# Patient Record
Sex: MALE | Race: WHITE
[De-identification: names, ages, dates, MRNs, and addresses within clinical notes are randomized per-mention and may not be internally consistent; named-entity substitution may affect disease eponyms.]

---
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
## 2021-01-08 ENCOUNTER — HOSPITAL ENCOUNTER (EMERGENCY)
Dept: HOSPITAL 97 - ER | Age: 21
Discharge: HOME | End: 2021-01-08
Payer: COMMERCIAL

## 2021-01-08 VITALS — SYSTOLIC BLOOD PRESSURE: 125 MMHG | OXYGEN SATURATION: 98 % | DIASTOLIC BLOOD PRESSURE: 84 MMHG

## 2021-01-08 VITALS — TEMPERATURE: 97.8 F

## 2021-01-08 DIAGNOSIS — Z88.8: ICD-10-CM

## 2021-01-08 DIAGNOSIS — N45.1: Primary | ICD-10-CM

## 2021-01-08 PROCEDURE — 76870 US EXAM SCROTUM: CPT

## 2021-01-08 PROCEDURE — 96372 THER/PROPH/DIAG INJ SC/IM: CPT

## 2021-01-08 PROCEDURE — 99284 EMERGENCY DEPT VISIT MOD MDM: CPT

## 2021-01-08 NOTE — RAD REPORT
EXAM DESCRIPTION:  US - Scrotum Testicles - 1/8/2021 6:20 pm

 

CLINICAL HISTORY:  PAIN

 

COMPARISON:  Scrotum Testicles dated 8/31/2019; Abdomen   Pelvis W Contrast dated 11/24/2020

 

FINDINGS:  Right testicle shows homogeneous echogenicity with no focal lesion. Doppler evaluation hugo
ws arterial and venous flow within the right testicle. No right epididymis enlargement or hyperemia.

 

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 In the left hemiscrotum there is a 3 centimeter oval heterogeneou
s mass. Doppler evaluation shows blood flow along the margin of this mass. This is probably scar tiss
ue or some form of benign so surgical mass. November 2020 CT study showed a similar. It is uncertain 
if the August 2019 study evaluated this portion of the left scrotum.

 

IMPRESSION:  No right testicle abnormality. Normal blood flow seen.

## 2021-01-08 NOTE — EDPHYS
Physician Documentation                                                                           

 [facility]                                                                 

Name: David Allison                                                                              

Age: 20 yrs                                                                                       

Sex: Male                                                                                         

: 2000                                                                                   

MRN: [id number]                                                                                   

Arrival Date: 2021                                                                          

Time: 17:05                                                                                       

Account#: [id number]                                                                            

Bed 17                                                                                            

Private MD:                                                                                       

ED Physician Arpit Pradhan                                                                         

HPI:                                                                                              

                                                                                             

19:52 This 20 yrs old  Male presents to ER via Ambulatory with complaints of         kb  

      Testicular Pain, Testicular Lump.                                                           

19:52 The patient presents with scrotal pain, of the left side, in the area of the            kb  

      epidydimis, with swelling. Onset: The symptoms/episode began/occurred this morning.         

      Modifying factors: The symptoms are alleviated by nothing, the symptoms are aggravated      

      by pressure. Associated signs and symptoms: The patient has no apparent associated          

      signs or symptoms. Severity of symptoms: At their worst the symptoms were mild,             

      moderate, in the emergency department the symptoms are unchanged. The patient has not       

      experienced similar symptoms in the past. The patient has not recently seen a physician.    

                                                                                                  

Historical:                                                                                       

- Allergies:                                                                                      

17:35 Abilify;                                                                                ss  

17:35 Advair Diskus;                                                                          ss  

17:35 Geodon;                                                                                 ss  

17:35 Seroquel;                                                                               ss  

17:35 Wellbutrin;                                                                             ss  

- PMHx:                                                                                           

17:35 ADD/ADHD; Anxiety; Depression; Hypertension; PTSD; testicular torsion;                  ss  

- PSHx:                                                                                           

17:35 left testicle removal;                                                                  ss  

                                                                                                  

- Immunization history:: Adult Immunizations up to date.                                          

- Social history:: Smoking status: Patient denies any tobacco usage or history of.                

                                                                                                  

                                                                                                  

ROS:                                                                                              

19:52 Constitutional: Negative for fever, chills, and weight loss, Cardiovascular: Negative   kb  

      for chest pain, palpitations, and edema, Respiratory: Negative for shortness of breath,     

      cough, wheezing, and pleuritic chest pain, Abdomen/GI: Negative for abdominal pain,         

      nausea, vomiting, diarrhea, and constipation, MS/Extremity: Negative for injury and         

      deformity, Skin: Negative for injury, rash, and discoloration, Neuro: Negative for          

      headache, weakness, numbness, tingling, and seizure.                                        

19:52 : Positive for testicular pain                                                            

                                                                                                  

Exam:                                                                                             

19:52 Constitutional:  This is a well developed, well nourished patient who is awake, alert,  kb  

      and in no acute distress. Head/Face:  Normocephalic, atraumatic. Chest/axilla:  Normal      

      chest wall appearance and motion.  Nontender with no deformity.  No lesions are             

      appreciated. Cardiovascular:  Regular rate and rhythm with a normal S1 and S2.  No          

      gallops, murmurs, or rubs.  Normal PMI, no JVD.  No pulse deficits. Respiratory:  Lungs     

      have equal breath sounds bilaterally, clear to auscultation and percussion.  No rales,      

      rhonchi or wheezes noted.  No increased work of breathing, no retractions or nasal          

      flaring. Abdomen/GI:  Soft, non-tender, with normal bowel sounds.  No distension or         

      tympany.  No guarding or rebound.  No evidence of tenderness throughout. Skin:  Warm,       

      dry with normal turgor.  Normal color with no rashes, no lesions, and no evidence of        

      cellulitis. MS/ Extremity:  Pulses equal, no cyanosis.  Neurovascular intact.  Full,        

      normal range of motion. Neuro:  Awake and alert, GCS 15, oriented to person, place,         

      time, and situation.  Cranial nerves II-XII grossly intact.  Motor strength 5/5 in all      

      extremities.  Sensory grossly intact.  Cerebellar exam normal.  Normal gait.                

19:52 : Male external genitalia: Circumcision noted. tenderness, of the epididymis area,        

      that is moderate.                                                                           

                                                                                                  

Vital Signs:                                                                                      

17:33  / 88; Pulse 90; Resp 15; Temp 97.8(TE); Pulse Ox 99% on R/A; Weight 95.25 kg;    ss  

      Height 5 ft. 9 in. (175.26 cm); Pain 8/10;                                                  

20:00  / 84; Pulse 88; Resp 18; Pulse Ox 98% on R/A;                                    wh  

17:33 Body Mass Index 31.01 (95.25 kg, 175.26 cm)                                             ss  

                                                                                                  

MDM:                                                                                              

19:38 Patient medically screened.                                                             kb  

19:51 Data reviewed: vital signs, nurses notes. Data interpreted: Pulse oximetry: on room air kb  

      is 99 %. Interpretation: normal. Counseling: I had a detailed discussion with the           

      patient and/or guardian regarding: the historical points, exam findings, and any            

      diagnostic results supporting the discharge/admit diagnosis, radiology results, the         

      need for outpatient follow up, a urologist, to return to the emergency department if        

      symptoms worsen or persist or if there are any questions or concerns that arise at home.    

                                                                                                  

                                                                                             

17:43 Order name: US Scrotum Testicles; Complete Time: 21:16                                    

                                                                                                  

Administered Medications:                                                                         

20:02 Drug: Doxycycline 100 mg Route: PO;                                                       

20:11 Follow up: Response: No adverse reaction                                                  

20:03 Drug: Rocephin (cefTRIAXone) 500 mg Route: IM; Site: right gluteus;                       

20:11 Follow up: Response: No adverse reaction                                                  

                                                                                                  

                                                                                                  

Disposition:                                                                                      

                                                                                             

09:17 Co-signature as Attending Physician, Arpit Pradhan MD.                                    rn  

                                                                                                  

Disposition:                                                                                      

21 19:46 Discharged to Home. Impression: Epididymitis.                                      

- Condition is Stable.                                                                            

- Discharge Instructions: Epididymitis.                                                           

- Prescriptions for Doxycycline Hyclate 100 mg Oral Tablet - take 1 tablet by ORAL                

  route every 12 hours; 20 tablet.                                                                

- Medication Reconciliation Form, Thank You Letter, Antibiotic Education, Prescription            

  Opioid Use form.                                                                                

- Follow up: Emergency Department; When: As needed; Reason: Worsening of condition.               

  Follow up: Private Physician; When: 2 - 3 days; Reason: Recheck today's complaints,             

  Continuance of care, Re-evaluation by your physician.                                           

                                                                                                  

                                                                                                  

                                                                                                  

Signatures:                                                                                       

Dispatcher [employer]                           EDElham Britton, FNP-C                 FNP-Ckb                                                   

Arpit Pradhan MD MD rn Smirch, Shelby, RN RN ss Habalo, Winsy                                                                                   

                                                                                                  

Corrections: (The following items were deleted from the chart)                                    

                                                                                             

19:52 19:51 Counseling: I had a detailed discussion with the patient and/or guardian            

      regarding: the historical points, exam findings, and any diagnostic results supporting      

      the discharge/admit diagnosis, the need for outpatient follow up, a urologist, to           

      return to the emergency department if symptoms worsen or persist or if there are any        

      questions or concerns that arise at home,                                                 

19:55 18:59 Urine Dipstick-Ancillary ordered. [facility]  

20:12 19:46 2021 19:46 Discharged to Home. Impression: Epididymitis. Condition is         

      Stable. Forms are Medication Reconciliation Form, Thank You Letter, Antibiotic              

      Education, Prescription Opioid Use. Follow up: Emergency Department; When: As needed;       

      Reason: Worsening of condition. Follow up: Private Physician; When: 2 - 3 days; Reason:     

      Recheck today's complaints, Continuance of care, Re-evaluation by your physician.         

                                                                                                  

**************************************************************************************************

## 2021-01-08 NOTE — XMS REPORT
Summary of Care

                           Created on:2020



Patient:David Allison

Sex:Male

:2000

External Reference #:[id number]





Demographics







                          Address                   [address] 



                                                    [place] [address]

 

                          Mobile Phone              [phone]

 

                          Phone                     [phone]

 

                          Home Phone                [phone]

 

                          Email Address             [email or web address]

 

                          Preferred Language        English

 

                          Marital Status            Single

 

                          Hinduism Affiliation     Unknown

 

                          Race                      White

 

                          Ethnic Group               or 









Author







                          Organization              [facility]

 

                          Address                   [address] [place] [address]









Support







                Name            Relationship    Address         Phone

 

                Nikita Allison Jr. Uncle           [address]  [phone]

[address]



                                                [place] [address] 









Care Team Providers







                    Name                Role                Phone

 

                    Pcp,  Does Not Have A Primary Care Provider [phone]









Encounter Details







             Date         Type         Department   Care Team    Description

 

                    2020          Letter (Out)        [facility] Message

s



                          Doctor Unassigned, No     



                                                            [address] [facility]



                                        Name



                                        



                                                            [place] [address]-

[address]



                                        [address] [facility]



                                        



                                       [phone] [place] [address] 







Allergies







             Active Allergy Reactions    Severity     Noted Date   Comments

 

             Aripiprazole Anxiety, Shortness of Medium       2015   Inform

ed by parent



                          Breath                                 

 

             Fluticasone  Shortness of Breath Medium       2015   Informed

 by parent



             Propion-Salmeterol                                        

 

             Ziprasidone Hcl Anxiety, Shortness of Medium       2015   Inf

ormed by parent



                          Breath                                 

 

             Quetiapine Fumarate Anaphylaxis, Medium       2015   Informed

 by parent



                          Shortness of Breath                           

 

             Bupropion    Anxiety, Shortness of Medium       2015   Inform

ed by parent



                          Breath                                 



documented as of this encounter (statuses as of 2020)



Medications







          Medication Sig       Dispensed Refills   Start Date End Date  Status

 

          hydrOXYzine (ATARAX) Take 25 mg by           0                        

     Active



          25 mg tablet mouth at bedtime.                                        

 

 

          propranolol (INDERAL) Take 10 mg by           0                       

      Active



          10 mg tablet mouth 2 (two)                                         



                    times daily.                                         

 

          NIFEdipine XL Take 30 mg by           0                             Ac

tive



          (NIFEDICAL XL) 30 mg mouth daily.                                     

    



          24 hr                                                       



          tabletIndications:                                                   



          Nausea and vomiting in                                                

   



          pediatric patient,                                                   



          Diarrhea, unspecified                                                 

  



          type, Gastroenteritis                                                 

  

 

          cetirizine 10 mg TK 1 T PO QHS           3         2016         

  Active



          tablet                                                      

 

          polyethylene glycol 17 as needed.           0         2017      

     Active



          gram/dose powder                                                   

 

          FLUoxetine 40 mg daily.              1         2017           Ac

tive



          capsule                                                     

 

          citalopram 20 mg Take 20 mg by           0                            

 Active



          tablet    mouth daily.                                         

 

          atomoxetine 18 mg atomoxetine 18 mg           0         2020    

       Active



          capsule   capsule                                           



documented as of this encounter (statuses as of 2020)



Active Problems







                          Problem                   Noted Date

 

                          Testicular torsion        2017



documented as of this encounter (statuses as of 2020)



Social History







             Tobacco Use  Types        Packs/Day    Years Used   Date

 

             Never Smoker                                        









                Smokeless Tobacco: Never Used                                 









                Alcohol Use     Drinks/Week     oz/Week         Comments

 

                Not Asked       0 Standard drinks or equivalent 0.0             









                          Sex Assigned at Birth     Date Recorded

 

                          Not on file               



documented as of this encounter



Last Filed Vital Signs

Not on filedocumented in this encounter



Plan of Treatment







                Health Maintenance Due Date        Last Done       Comments

 

                HEPATITIS B VACCINES (1 of 3 2001                      



                - 3-dose primary series)                                 

 

                HEPATITIS A VACCINES (1 of 2 2002                      



                - 2-dose series)                                 

 

                MMR VACCINES (1 of 2 - 2002                      



                Standard series)                                 

 

                VARICELLA VACCINES (1 of 2 - 2002                      



                2-dose childhood series)                                 

 

                DTaP,Tdap,and Td Vaccines (1 2008                      



                - Tdap)                                         

 

                MENINGOCOCCAL B VACCINES (1 2011                      



                of 2 - Risk Bexsero 2-dose                                 



                series)                                         

 

                HPV VACCINES (1 - Male 2012                      



                2-dose series)                                  

 

                WELL CARE VISIT: 12-21 YEARS 2013                      



                (yearly)                                        

 

                MENINGOCOCCAL VACCINE (1 - 2017                      



                2-dose series)                                  

 

                INFLUENZA VACCINE (#1) 2020      10/21/2019, 2018, 



                                                2014, Additional 



                                                history exists  

 

                Depression Screening 2021      

 

                IPV VACCINES    Aged Out                        No longer eligib

le



                                                                based on patient

's age



                                                                to complete this

 topic

 

                PNEUMOCOCCAL 0-64 YEARS Aged Out                        No longe

r eligible



                COMBINED SERIES                                 based on patient

's age



                                                                to complete this

 topic



documented as of this encounter



Results

Not on filedocumented in this encounter



Insurance







          Payer     Benefit Plan / Subscriber ID Effective Dates Phone     Addre

ss   Type



                    Group                                             

 

          [employer] [place] [employer] [id number] 2016-[employer] Medicaid



          HEALTH PLAN - HEALTH                                           



          MANAGED MEDICAID                                                   



documented as of this encounter

## 2021-01-08 NOTE — XMS REPORT
Summary of Care

                           Created on:2020



Patient:David Allison

Sex:Male

:2000

External Reference #:[id number]





Demographics







                          Address                   [address] 



                                                    [place] [address]

 

                          Mobile Phone              [phone]

 

                          Phone                     [phone]

 

                          Home Phone                [phone]

 

                          Email Address             [email or web address]

 

                          Preferred Language        English

 

                          Marital Status            Single

 

                          Congregational Affiliation     Unknown

 

                          Race                      White

 

                          Ethnic Group               or 









Author







                          Organization              [facility]

 

                          Address                   [address] [place] [id number]









Support







                Name            Relationship    Address         Phone

 

                Nikita Allison Jr. Uncle           [address]  [phone]

[address]



                                                [place] [address] 









Care Team Providers







                    Name                Role                Phone

 

                    Pcp,  Does Not Have A Primary Care Provider [phone]









Reason for Visit







                          Reason                    Comments

 

                          BITE                      x 3 wks, bilat lower legs, i

tchy

 

                          Sore Throat               x 2020







Encounter Details







             Date         Type         Department   Care Team    Description

 

             2020   Urgent Care  [facility] Unknown, Attending Sor

e throat 

(Primary Dx);



                                                            Medicine - [place]



                                        



Provider, [facility]               Bug bite with infection, initial encount

er



                                       [address] [place], [place]              



                                                            [address] [phone]              







Allergies







             Active Allergy Reactions    Severity     Noted Date   Comments

 

             Aripiprazole Anxiety, Shortness of Medium       2015   Inform

ed by parent



                          Breath                                 

 

             Fluticasone  Shortness of Breath Medium       2015   Informed

 by parent



             Propion-Salmeterol                                        

 

             Ziprasidone Hcl Anxiety, Shortness of Medium       2015   Inf

ormed by parent



                          Breath                                 

 

             Quetiapine Fumarate Anaphylaxis, Medium       2015   Informed

 by parent



                          Shortness of Breath                           

 

             Bupropion    Anxiety, Shortness of Medium       2015   Inform

ed by parent



                          Breath                                 



documented as of this encounter (statuses as of 2020)



Medications







          Medication Sig       Dispensed Refills   Start Date End Date  Status

 

          hydrOXYzine (ATARAX) Take 25 mg by           0                        

     Active



          25 mg tablet mouth at bedtime.                                        

 

 

          propranolol (INDERAL) Take 10 mg by           0                       

      Active



          10 mg tablet mouth 2 (two)                                         



                    times daily.                                         

 

          NIFEdipine XL Take 30 mg by           0                             Ac

tive



          (NIFEDICAL XL) 30 mg mouth daily.                                     

    



          24 hr                                                       



          tabletIndications:                                                   



          Nausea and vomiting                                                   



          in pediatric patient,                                                 

  



          Diarrhea, unspecified                                                 

  



          type, Gastroenteritis                                                 

  

 

          cetirizine 10 mg TK 1 T PO QHS           3         2016         

  Active



          tablet                                                      

 

          polyethylene glycol as needed.           0         2017         

  Active



          17 gram/dose powder                                                   

 

          FLUoxetine 40 mg daily.              1         2017           Ac

tive



          capsule                                                     

 

          citalopram 20 mg Take 20 mg by           0                            

 Active



          tablet    mouth daily.                                         

 

          atomoxetine 18 mg atomoxetine 18 mg           0         2020    

       Active



          capsule   capsule                                           

 

          cephALEXin (KEFLEX) Take 1 capsule by 14 capsule 0         2020 

12/15/2020 Active



          500 mg    mouth 2 (two)                                         



          capsuleIndications: times daily for 7                                 

        



          Bug bite with days.                                             



          infection, initial                                                   



          encounter                                                   

 

          diphenhydrAMINE Take 1 capsule by 20 capsule 0         2020 12/1

3/2020 Active



          (BENADRYL) 25 mg mouth every 6                                        

 



          capsuleIndications: (six) hours as                                    

     



          Bug bite with needed for                                         



          infection, initial Itching or                                         



          encounter Allergies for up                                         



                    to 5 days.                                         









          Hospital, Clinic, or Other Ordered Dose Route     Frequency Start Date

 End Date  

Status



          Facility Administered                                                 

  



          Medication                                                   

 

          dexamethasone (DECADRON 8 mg      IVP       ONCE      2020 Ended



          PHOSPHATE) injection 8 mg                                             

      



documented as of this encounter (statuses as of 2020)



Active Problems







                          Problem                   Noted Date

 

                          Testicular torsion        2017



documented as of this encounter (statuses as of 2020)



Social History







             Tobacco Use  Types        Packs/Day    Years Used   Date

 

             Never Smoker                                        









                Smokeless Tobacco: Never Used                                 









                Alcohol Use     Drinks/Week     oz/Week         Comments

 

                Not Asked       0 Standard drinks or equivalent 0.0             









                          Sex Assigned at Birth     Date Recorded

 

                          Not on file               



documented as of this encounter



Last Filed Vital Signs







                Vital Sign      Reading         Time Taken      Comments

 

                Blood Pressure  140/85          2020  5:11 PM CST 

 

                Pulse           90              2020  5:08 PM CST 

 

                Temperature     37.4 C (99.3 F) 2020  5:08 PM CST 

 

                Respiratory Rate 18              2020  5:08 PM CST 

 

                Oxygen Saturation 97%             2020  5:08 PM CST 

 

                Inhaled Oxygen Concentration -               -               

 

                Weight          89.4 kg (197 lb) 2020  5:08 PM CST 

 

                Height          172.7 cm (5' 8") 2020  5:08 PM CST 

 

                Body Mass Index 29.95           2020  5:08 PM CST 



documented in this encounter



Patient Instructions

Patient InstructionsParadise Winston FNP - 2020  5:00 PM CST

Patient Education



Infected Insect Bite or Sting



When an insect stings you, it injects venom. When an insect bites you, it does 
not. Stings and bitesmay cause a local reaction. Or they may cause a reaction 
that affects your whole body. Bites and stings may become infected. Signs of 
infection include redness,warmth, pain, drainage of pus,and swelling. 
Infections will need treatment with antibiotics and should get better over the 
next 10 days. But they can sometimes form a pocket of pus (abscess) that needs 
to be opened by a healthcare providerto release the pus.

Home care

The following will help you care for your bite or sting at home:

 If a stinger is still in your skin, it will need to be removed. Don't use 
tweezers that might push more venom into the skin. Gently scrape the stinger 
from the side with a firm object such as the side of a credit card. This will 
loosen it and remove it from your skin. Wash the area with soap and water.

 If itching is a problem, applying ice packs to the sting area will help.

 Wash the area with soap and water at least 3 times a day. Apply a topical 
antibiotic cream or ointment.

 You can use an over-the counter antihistamine unless your healthcare provider
has given you a prescription antihistamine. You may use antihistamines to reduce
itching if large areas of the skin are involved. Use lower doses during the 
daytime and higher doses at bedtime since the drug may make you sleepy. Don't 
use an antihistamine if you have glaucoma or if you are a man with trouble 
urinating due to an enlarged prostate. Some antihistamines cause less drowsiness
and are a good choice for daytime use.

 If oral antibiotics have been prescribed, be sure to take them as directed 
until they are all finished.

 You may use over-the-counter pain medicine to control pain, unless another 
pain medicine was prescribed. Talk with your healthcare provider before using 
acetaminophen or ibuprofen if you have chronic liver or kidney disease. Also 
talk with your doctor if you have ever had a stomach ulcer or digestive 
bleeding.

Follow-up care

Follow up with your healthcare provider, or as advised if you don't get better 
over the next 2 days or if your symptoms get worse.

Call 911

Call 911 if any of these occur:

 Swelling of the face, eyelids, mouth, throat, or tongue

 Trouble swallowing or breathing

 Chest tightness

When to seek medical advice

Call your healthcare provider right away if any of these occur:

 Spreading areas of redness or swelling

 Fever of 100.4F (38C) or higher, or as directed by your healthcare 
provider

 Increasing pain, redness, swelling or drainage

 Headache, fever, chills, muscle or joint aching, or vomiting,

 New rash

Pedro Luis last reviewed this educational content on 10/1/2019

 3874-5594 [employer]. [address] [place] [address] 
[address]. All rights reserved. This information is not intended as a substitute for
professional medical care. Always follow your healthcare professional's 
instructions.







Patient Education



Acute Viral Pharyngitis (Sore Throat)



You or your child have a sore throat (pharyngitis). This infection is caused by 
a virus. Itcan cause throat pain that is worse when swallowing, aching all 
over, headache,and fever. The infection may be spread by coughing, 
kissing,or touching others after touching your mouth or nose. Antibiotic me
dicines don't work against viruses. They are not used for treating this illness.

Home care

 If symptoms are severe, you or your child should rest at home. Return to work
or school when you,or your child, feel well enough.

 You or your child should drink plenty of fluids to prevent dehydration.

 Adults, and children 5 years and older, can use throat lozenges or numbing 
throat sprays to help reduce pain. Gargling with warm salt water will also help 
reduce throat pain. Dissolve 1/2 teaspoon of salt in 1 glass of warm water. 
Children can sip on juice or an ice pop. Children 5 years and older can also 
suck on a lollipop or hard candy. (Hard candy and lozenges can be a choking 
hazard in children younger than 5 years.)

 Dont eat salty or spicy foods or give them to your child. These can be 
irritating to the throat.

Medicines for a child: You can give your child acetaminophen for fever, 
fussiness, or discomfort. Inbabies over 6 months of age, you may use ibuprofen 
as well as acetaminophen. If your child has chronic liver or kidney disease or 
ever had a stomach ulcer or gastrointestinal bleeding, talk with your bibi 
healthcare provider before giving these medicines. Aspirin should never be used 
by any childunder 18 years of age who has a fever. It may cause severe liver 
damage and death. Don't give your child any other medicine without first asking 
your child's healthcare provider, especially the first time.

Medicines for an adult: You may use acetaminophen, naproxen, or ibuprofen to 
control pain or fever, unless another medicine was prescribed for this. If you 
have chronic liver or kidney disease or ever had a stomach ulcer or 
gastrointestinal bleeding, talk with your healthcare provider before using these
medicines.

Follow-up care

Follow up with a healthcare provider or as advised if you or your child are not 
getting better over the next week.

When to get medical advice

Call your healthcare provider right away if any of these occur:

 Fever (see Fever and children, below)

 New or worsening ear pain, sinus pain, or headache

 Painful lumps in the back of neck

 Stiff neck

 Lymph nodes are getting larger

 Cant open mouth wide due to throat pain

 New rash

 Other symptoms are getting worse

Call 911

Call 911 or get medical care right away if any of these occur:



 Trouble breathing or noisy breathing

 Muffled voice

 Can't swallow liquids, a lot of drooling, or any other symptoms that may mean
worsening swelling in the throat

 Signs of dehydration such as very dark urine or no urine, sunken eyes, 
dizziness



Fever and children

Use a digital thermometer to check your bibi temperature. Dont use a 
mercury thermometer. There are different kinds and uses of digital thermometers.
They include:

 Rectal. For children younger than 3 years, a rectal temperature is the most 
accurate.

 Forehead (temporal).  This works for children age 3 months and older. If a 
child under 3 months old has signs of illness, this can be used for a first 
pass. The provider may want to confirm with a rectal temperature.

 Ear (tympanic). Ear temperatures are accurate after 6 months of age, but not 
before.

 Armpit (axillary).  This is the least reliable but may be used for a first 
pass to check a child of any age with signs of illness. The provider may want to
confirm with a rectal temperature.

 Mouth (oral). Dont use a thermometer in your bibi mouth until he or 
she is at least 4 years old.

Use the rectal thermometer with care. Follow the product makers directions 
for correct use. Insert it gently. Label it and make sure its not used in the
mouth. It may pass on germs from the stool. If you dont feel OK using a 
rectal thermometer, ask the healthcare provider what type to use instead. When 
you talk with any healthcare provider about your bibi fever, tell him or 
her which type you used.

Below are guidelines to know if your young child has a fever. Your bibi 
healthcare provider maygive you different numbers for your child. Follow your 
providers specific instructions.

Fever readings for a baby under 3 months old:

 First, ask your bibi healthcare provider how you should take the 
temperature.

 Rectal or forehead: 100.4F (38C) or higher

 Armpit: 99F (37.2C) or higher

Fever readings for a child age 3 months to 36 months (3 years):

 Rectal, forehead, or ear: 102F (38.9C) or higher

 Armpit: 101F (38.3C) or higher

Call the healthcare provider in these cases:

 Repeated temperature of 104F (40C) or higher in a child of any age

 Fever of 100.4 or higher in baby younger than 3 months

 Fever that lasts more than 24 hours in a child under age 2

 Fever that lasts for 3 days in a child age 2 or older

[employer] last reviewed this educational content on 2020-2020 [employer]. All rights reserved. This information is
not intended as a substitute for professional medical care. Always follow your 
healthcare professional's instructions.





Electronically signed by Paradise Winston FNP at 2020  5:45 PM CST

documented in this encounter



Progress Notes

Juliet Pastrana RN - 2020  5:00 PM CST19 year old male has been identified
by  and name.  Verbal consent has been obtained by patient to have an 
injection, as ordered by the provider.



The site was cleaned with an alcohol swab and given intramuscularly (IM).  A 
band aid dressing was then applied to the injection site.  The patient tolerated
the procedure well and was observed for 20 minutes after the injection for 
possible reaction.



Patient provided with preferred teaching of verbal information on Anaphylaxis. 
Shows readiness to learn. Verbal/Written instruction teaching provided. 
Individual is able to read and verbalizes understanding of teaching provided. 
Signs and Symptoms of Anaphylaxis (severe allergic reaction) are: Tingling, 
itching or metallic taste in mouth; hives; difficulty breathing; swelling and/or
itching of mouth and/or throat; diarrhea, vomiting, cramps and stomach pain; 
paleness; loss of consciousness. IF YOU HAVE ANY OF THE SYMPTOMS ABOVE, ACT 
FAST!!! CALL 911 IMMEDIATELY IF YOU HAVE A PRESCRIBED EPI-PEN PLEASE USE IT NOW.
Electronically signed by Juliet Pastrana RN at 2020  6:14 PM Paradise Rosas FNP - 2020  5:00 PM CSTFormatting of this note might be different
from the original.

Urgent Care:

[facility]



Patient Name: David Allison

YOB: 2000 19 year old

MRN: [id number]

Primary Care Physician: PATIENT DOES NOT HAVE A PCP



Chief Complaint



Chief Complaint

Patient presents with

 BITE

  x 3 wks, bilat lower legs, itchy

 Sore Throat

  x 2020



HPI



19 yr old male here for 3 week history of insect bites. Patient states he is 
only outside hen he walks his do and noticed the bites after that. Patient also 
c/o sore throat and PND



Insect Bite

Location:  Leg

Leg rash location:  R lower leg and L lower leg

Quality: draining, itchiness, painful and redness

Onset quality:  Gradual

Duration:  3 weeks

Timing:  Constant

Progression:  Worsening

Chronicity:  New

Context: insect bite/sting

Relieved by:  Nothing

Worsened by:  Nothing

Associated symptoms: sore throat

Associated symptoms: no abdominal pain, no diarrhea, no fatigue, no fever, no 
headaches, no hoarse voice, no joint pain, no myalgias, no nausea, no 
periorbital edema, no shortness of breath, no throat swelling, no tongue 
swelling, no URI, not vomiting and not wheezing

Sore Throat

Location:  Generalized

Severity:  Mild

Onset quality:  Gradual

Duration:  3 days

Timing:  Constant

Chronicity:  New

Associated symptoms: postnasal drip

Associated symptoms: no abdominal pain, no adenopathy, no chest pain, no chills,
no cough, no drooling, no ear discharge, no ear pain, no eye discharge, no 
fever, no headaches, no neck stiffness, no night sweats, no plugged ear 
sensation, no rash, no rhinorrhea, no shortness of breath, no sinus congestion, 
no trouble swallowing and no voice change

Risk factors: no sick contacts



Past Medical History / Immunizations



Past Medical History:

Diagnosis Date

 Allergic rhinitis

 Anxiety

 Depression

 High blood pressure

 OCD (obsessive compulsive disorder)

 PTSD (post-traumatic stress disorder)









Past Surgical History



Past Surgical History:

Procedure Laterality Date

 ORCHIECTOMY Left 2017

 Surgeon: Akiko Ridley MD;  Location: [facility] OR Location

 ORCHIOPEXY Right 2017

 Surgeon: Akiko Ridley MD;  Location: [facility] OR Location

 SCROTAL EXPLORATION N/A 2017

 Surgeon: Akiko Ridley MD;  Location: [facility] OR Location



Allergies



Allergies

Allergen Reactions

 Abilify [Aripiprazole] Anxiety and Shortness of Breath

  Informed by parent

 Advair Diskus [Fluticasone Propion-Salmeterol] Shortness of Breath

  Informed by parent

 Geodon [Ziprasidone Hcl] Anxiety and Shortness of Breath

  Informed by parent

 Seroquel [Quetiapine Fumarate] Anaphylaxis and Shortness of Breath

  Informed by parent

 Wellbutrin [Bupropion] Anxiety and Shortness of Breath

  Informed by parent



Review of Systems



Review of Systems

Constitutional: Negative for chills, fatigue, fever and night sweats.

HENT: Positive for postnasal drip and sore throat. Negative for drooling, ear 
discharge, ear pain, hoarse voice, rhinorrhea, trouble swallowing and voice 
change.

Eyes: Negative for discharge.

Respiratory: Negative for cough, shortness of breath and wheezing.

Cardiovascular: Negative for chest pain.

Gastrointestinal: Negative for abdominal pain, diarrhea, nausea and vomiting.

Musculoskeletal: Negative for arthralgias, myalgias and neck stiffness.

Skin: Negative for rash.

Neurological: Negative for headaches.

Hematological: Negative for adenopathy.



Physical Exam



BP (!) 140/85  | Pulse 90  | Temp 37.4 C (99.3 F) (Oral)  | Resp 18  | Ht 5'
 8" (1.727 m)  | Wt 197 lb (89.4 kg)  | SpO2 97%  | BMI 29.95 kg/m



Physical Exam

Vitals signs and nursing note reviewed.

Constitutional:

   General: He is not in acute distress.

   Appearance: He is well-developed. He is not ill-appearing, toxic-appearing or
 diaphoretic.

HENT:

   Right Ear: Hearing, tympanic membrane, ear canal and external ear normal.

   Left Ear: Hearing, tympanic membrane, ear canal and external ear normal.

   Nose: Nose normal.

   Right Sinus: No maxillary sinus tenderness or frontal sinus tenderness.

   Left Sinus: No maxillary sinus tenderness or frontal sinus tenderness.

   Mouth/Throat:

   Pharynx: Uvula midline. No oropharyngeal exudate.

   Tonsils: 2+ on the right. 2+ on the left.

Eyes:

   Conjunctiva/sclera: Conjunctivae normal.

   Pupils: Pupils are equal, round, and reactive to light.

Neck:

   Musculoskeletal: Normal range of motion and neck supple.

Cardiovascular:

   Rate and Rhythm: Normal rate.

Pulmonary:

   Effort: Pulmonary effort is normal. No tachypnea, bradypnea, accessory muscle
 usage or respiratory distress.

   Breath sounds: Normal breath sounds. No decreased breath sounds, wheezing, 
rhonchi or rales.

Chest:

   Chest wall: No tenderness.

Musculoskeletal: Normal range of motion.

Skin:

   General: Skin is warm and dry.

   Capillary Refill: Capillary refill takes less than 2 seconds.

   Findings: No rash.

Neurological:

   Mental Status: He is alert and oriented to person, place, and time.

Psychiatric:

   Behavior: Behavior normal. Behavior is cooperative.

   Thought Content: Thought content normal.

   Judgment: Judgment normal.



Labs



Recent Results (from the past 24 hour(s))

POCT GRP A STREP (MOLECULAR)

 Collection Time: 20  5:33 PM

Result Value Ref Range

 POCT GP A STREP neg Negative - Negative



Orders and Treatments



Orders Placed This Encounter

Procedures

 POCT GRP A STREP (MOLECULAR)



Outpatient Encounter Medications as of 2020

Medication Sig

 cephALEXin (KEFLEX) 500 mg capsule Take 1 capsule by mouth 2 (two) times 
daily for 7 days.

 diphenhydrAMINE (BENADRYL) 25 mg capsule Take 1 capsule by mouth every 6 
(six) hours as needed for Itching or Allergies for up to 5 days.

 atomoxetine 18 mg capsule atomoxetine 18 mg capsule

 citalopram 20 mg tablet Take 20 mg by mouth daily.

 FLUoxetine 40 mg capsule daily.

 polyethylene glycol 17 gram/dose powder as needed.

 cetirizine 10 mg tablet TK 1 T PO QHS

 NIFEdipine XL (NIFEDICAL XL) 30 mg 24 hr tablet Take 30 mg by mouth daily.

 propranolol (INDERAL) 10 mg tablet Take 10 mg by mouth 2 (two) times daily.

 hydrOXYzine (ATARAX) 25 mg tablet Take 25 mg by mouth at bedtime.

 [] dexamethasone (DECADRON PHOSPHATE) injection 8 mg



No results found for this visit on 20.



Diagnosis



David was seen today for bite and sore throat.



Diagnoses and all orders for this visit:



Sore throat

-     POCT GRP A STREP (MOLECULAR)



Bug bite with infection, initial encounter

-     cephALEXin (KEFLEX) 500 mg capsule; Take 1 capsule by mouth 2 (two) times 
daily for 7 days.

-     diphenhydrAMINE (BENADRYL) 25 mg capsule; Take 1 capsule by mouth every 6 
(six) hours as needed for Itching or Allergies for up to 5 days.



Other orders

-     dexamethasone (DECADRON PHOSPHATE) injection 8 mg



Disposition &amp; Follow Up



- Discussed  diagnosis and treatment plan with pt.

- pt advised on frequent effective handwashing

- pt advised to increase fluid intake

-  advised to have the pt take OTC to treat symptoms.

- Pt advised to administer Tylenol as per label recommendation as needed for 
pain or fever

- AVS and Written/handout materials appropriate to problem and teaching 
provided.

- advised to go to the nearest Emergency Department sooner for any new, 
worsening, persistent, or concerning symptoms

- Patient verbalized understanding of all instructions



RIGOBERTO Choudhary  2020  5:22 PM

Electronically signed by Paradise Winston FNP at 2020  6:14 PM CST
Juliet Pastrana RN - 2020  5:00 PM CSTFormatting of this note might be 
different from the original.

David Allison is a 19 year old male

Chief Complaint

Patient presents with

 BITE

  x 3 wks, bilat lower legs, itchy

 Sore Throat

  x 2020



Vitals:

 20 1708 20 1711

BP: (!) 148/88 (!) 140/85

Pulse: 90

Resp: 18

Temp: 37.4 C (99.3 F)

TempSrc: Oral

SpO2: 97%

Weight: 197 lb (89.4 kg)

Height: 5' 8" (1.727 m)



[id number] - [place], [place] - [address] AT [facility] 
[address]

Phone: [phone] Fax: [phone]



 Patient AAOx4 and in no acute distress.



All Vitals taken, allergies and all medications reviewed, fall risk assessed.
Electronically signed by Juliet Pastrana RN at 2020  6:14 PM CSTdocumented
 in this encounter



Plan of Treatment







                Health Maintenance Due Date        Last Done       Comments

 

                HEPATITIS B VACCINES (1 of 3 2001                      



                - 3-dose primary series)                                 

 

                HEPATITIS A VACCINES (1 of 2 2002                      



                - 2-dose series)                                 

 

                MMR VACCINES (1 of 2 - 2002                      



                Standard series)                                 

 

                VARICELLA VACCINES (1 of 2 - 2002                      



                2-dose childhood series)                                 

 

                DTaP,Tdap,and Td Vaccines (1 2008                      



                - Tdap)                                         

 

                MENINGOCOCCAL B VACCINES (1 2011                      



                of 2 - Risk Bexsero 2-dose                                 



                series)                                         

 

                HPV VACCINES (1 - Male 2012                      



                2-dose series)                                  

 

                WELL CARE VISIT: 12-21 YEARS 2013                      



                (yearly)                                        

 

                MENINGOCOCCAL VACCINE (1 - 2017                      



                2-dose series)                                  

 

                INFLUENZA VACCINE (#1) 2020      10/21/2019, 2018, 



                                                2014, Additional 



                                                history exists  

 

                Depression Screening 2021      

 

                IPV VACCINES    Aged Out                        No longer eligib

le



                                                                based on patient

's age



                                                                to complete this

 topic

 

                PNEUMOCOCCAL 0-64 YEARS Aged Out                        No longe

r eligible



                COMBINED SERIES                                 based on patient

's age



                                                                to complete this

 topic



documented as of this encounter



Procedures







             Procedure Name Priority     Date/Time    Associated Diagnosis Comme

nts

 

             POCT GRP A STREP Routine      2020  5:33 PM Sore throat  Resu

lts for this



             (MOLECULAR)               CST                       procedure are i

n



                                                                 the results



                                                                 section.



documented in this encounter



Results

POCT GRP A STREP (MOLECULAR) (2020  5:33 PM CST)





             Component    Value        Ref Range    Performed At Pathologist Sig

nature

 

             POCT GP A STREP neg          Negative - Negative              









                                        Specimen

 

                                        Swab - THROAT









                          Narrative                 Performed At

 

                          accurate development and interpretation of all interna

l controls 



documented in this encounter



Visit Diagnoses







                                        Diagnosis

 

                                        Sore throat - Primary







                                        Acute pharyngitis

 

                                        Bug bite with infection, initial encount

er



documented in this encounter



Administered Medications







           Medication Order MAR Action Action Date Dose       Rate       Site

 

           dexamethasone (DECADRON Given      2020  5:34 PM 8 mg          

        Left



                                        PHOSPHATE) injection 8 mg



                          CST                                    Dorsogluteal-IM



           8 mg, IV Push, ONCE, 1                                             



           dose, Tue 20 at 1830,                                           

  



           Routine                                                









                                                    



documented in this encounter



Insurance







          Payer     Benefit Plan / Subscriber ID Effective Dates Phone     Addre

ss   Type



                    Group                                             

 

          [employer] [employer] [place] [employer] [id number] 2016-[employer] Medicaid



          HEALTH PLAN - HEALTH                                           



          MANAGED MEDICAID                                                   









           Guarantor Name Account Type Relation to Date of Birth Phone      Bill

ing



                                 Patient                          Address

 

           Caroline Allison Personal/Family Mother     1965 [phone] [address]

. 







                                                                 (Home)



                                        [place], [place]



                                                       [phone]



                                                       (Work)     



documented as of this encounter

## 2021-01-08 NOTE — XMS REPORT
Continuity of Care Document

                           Created on:2021



Patient:DAVID THAYER

Sex:Male

:2000

External Reference #:[id number]





Demographics







                          Address                   [address]



                                                    [place] 



                                                    [place] [address]

 

                          Home Phone                [phone]

 

                          Mobile Phone              [phone]

 

                          Email Address             DECLINED 2021

 

                          Preferred Language        English

 

                          Marital Status            Unknown

 

                          Anabaptism Affiliation     Unknown

 

                          Race                      Unknown

 

                          Additional Race(s)        Unavailable



                                                    White

 

                          Ethnic Group              Unknown









Author







                          Organization              [facility]

t

 

                          Address                   [address]



                                                    [place] [address]

 

                          Phone                     [phone]









Support







                Name            Relationship    Address         Phone

 

                Estefany Camarena      Uncle           [address]  [phone]



                                                [place] [address] 









Care Team Providers







                    Name                Role                Phone

 

                    Provider,  Urgent Care Attending Clinician Unavailable

 

                    Doctor Unassigned,  Name Attending Clinician Unavailable

 

                    Kalia ZHENG           Attending Clinician [phone]

 

                    Urology,  Pedi      Attending Clinician Unavailable









Problems

This patient has no known problems.



Allergies, Adverse Reactions, Alerts

This patient has no known allergies or adverse reactions.



Medications

This patient has no known medications.



Procedures

This patient has no known procedures.



Encounters







        Start   End     Encounter Admission Attending Care    Care    Encounter 

Source



        Date/Time Date/Time Type    Type    Clinicians Facility Department ID   

   

 

        2020 Urgent          Provider, [facility]    [id number] [id number]

[id number] 



        17:03:55 17:50:35 Care            [facility]  [id number]        

 



                                        Care    [place] 4.2.7.2.686         



                                                Maureen [phone]         



                                                nal     044             



                                                Office                  



                                                Building                 



                                                One                     

 

        2020 Letter          Doctor MORE    [id number]

27 



        00:00:00 00:00:00 (Out)           Unassigned, JAMEY   [id number]       

  



                                        [place] [facility]"" 4.2.7.2.686         



                                                        [phone]         



                                                        044             

 

        2020 Urgent          Provider, [facility]    [phone] [id number]

[id number] 



        15:39:22 15:59:22 Care            [facility]  [id number]        

 



                                        Care    [place] 4.2.7.2.686         



                                                Maureen [phone]         



                                                nal     044             



                                                Office                  



                                                Building                 



                                                One                     

 

        2020 Letter          Doctor  DERIK    [id number]

58 



        00:00:00 00:00:00 (Out)           Unassigned, JAMEY   [id number]       

  



                                        [place] [facility]\"" 4.2.7.2.686         



                                                        [phone]         



                                                        044             

 

        2020 Urgent          [facility]    [id number]

22 



        18:52:09 20:25:58 Care            [facility]  [id number]         



                                                Surgical 4.2.7.2.686         



                                                [facility] [phone]         



                                                      370             



                                                [place]                 

 

        2019-09-10 2019-09-10 Office          Urology, [facility]    [id number]

351 



        14:09:40 15:58:32 Visit           [facility] [id number]        

 



                                                [facility]     4.2.7.2.686         



                                                [place]  [phone]         



                                                        298             







Results

This patient has no known results.

## 2021-01-08 NOTE — ER
Nurse's Notes                                                                                     

 [facility]                                                                 

Name: David Allison                                                                              

Age: 20 yrs                                                                                       

Sex: Male                                                                                         

: 2000                                                                                   

MRN: [id number]                                                                                   

Arrival Date: 2021                                                                          

Time: 17:05                                                                                       

Account#: [id number]                                                                            

Bed 17                                                                                            

Private MD:                                                                                       

Diagnosis: Epididymitis                                                                           

                                                                                                  

Presentation:                                                                                     

                                                                                             

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  

      testicle and this morning when I woke up at 0900 I had pain on the L side and I noticed     

      there was a lump.". Coronavirus screen: Client denies travel out of the [place] in the         

      last 14 days. Ebola Screen: Patient denies exposure to infectious person. Patient           

      denies travel to an Ebola-affected area in the 21 days before illness onset. Initial        

      Sepsis Screen: Does the patient meet any 2 criteria? No. Patient's initial sepsis           

      screen is negative. Does the patient have a suspected source of infection? No.              

      Patient's initial sepsis screen is negative. Risk Assessment: Do you want to hurt           

      yourself or someone else? Patient reports no desire to harm self or others. Onset of        

      symptoms was 2021.                                                              

17:33 Method Of Arrival: Ambulatory                                                           ss  

17:33 Acuity: JOSEPH 3                                                                           ss  

                                                                                                  

Historical:                                                                                       

- Allergies:                                                                                      

17:35 Abilify;                                                                                ss  

17:35 Advair Diskus;                                                                          ss  

17:35 Geodon;                                                                                 ss  

17:35 Seroquel;                                                                               ss  

17:35 Wellbutrin;                                                                             ss  

- PMHx:                                                                                           

17:35 ADD/ADHD; Anxiety; Depression; Hypertension; PTSD; testicular torsion;                  ss  

- PSHx:                                                                                           

17:35 left testicle removal;                                                                  ss  

                                                                                                  

- Immunization history:: Adult Immunizations up to date.                                          

- Social history:: Smoking status: Patient denies any tobacco usage or history of.                

                                                                                                  

                                                                                                  

Screenin:40 Abuse screen: Denies threats or abuse. Denies injuries from another. Nutritional        wh  

      screening: No deficits noted. Tuberculosis screening: No symptoms or risk factors           

      identified. Fall Risk None identified.                                                      

                                                                                                  

Assessment:                                                                                       

19:40 General: Appears in no apparent distress. Behavior is calm, cooperative, appropriate    wh  

      for age. Pain: Complains of pain in testicular. Neuro: Level of Consciousness is awake,     

      alert, obeys commands, Oriented to person, place, time, situation, Appropriate for age.     

      Cardiovascular: Capillary refill < 3 seconds. Respiratory: Airway is patent Respiratory     

      effort is even, unlabored, Respiratory pattern is regular, symmetrical. GI: Abdomen is      

      flat, non-distended. : Reports Scrotal pain: sudden onset. EENT: No signs and/or          

      symptoms were reported regarding the EENT system. Derm: Skin is intact, is healthy with     

      good turgor, Skin is pink, warm \T\ dry. normal. Musculoskeletal: Circulation, motion,      

      and sensation intact.                                                                       

                                                                                                  

Vital Signs:                                                                                      

17:33  / 88; Pulse 90; Resp 15; Temp 97.8(TE); Pulse Ox 99% on R/A; Weight 95.25 kg;    ss  

      Height 5 ft. 9 in. (175.26 cm); Pain 8/10;                                                  

20:00  / 84; Pulse 88; Resp 18; Pulse Ox 98% on R/A;                                    wh  

17:33 Body Mass Index 31.01 (95.25 kg, 175.26 cm)                                               

                                                                                                  

ED Course:                                                                                        

17:05 Patient arrived in ED.                                                                  ag5 

17:34 Triage completed.                                                                       ss  

17:35 Arm band placed on right wrist.                                                         ss  

18:21 US Scrotum Testicles In Process Unspecified.                                            EDMS

18:59 Elham Guevara FNP-C is PHCP.                                                        kb  

18:59 Arpit Pradhan MD is Attending Physician.                                                kb  

19:40 Patient has correct armband on for positive identification. Bed in low position. Call     

      light in reach. Side rails up X 1. Pulse ox on. NIBP on.                                    

19:55 Elie Martinez is Primary Nurse.                                                           

20:04 No provider procedures requiring assistance completed. Patient did not have IV access     

      during this emergency room visit.                                                           

                                                                                                  

Administered Medications:                                                                         

20:02 Drug: Doxycycline 100 mg Route: PO;                                                     wh  

20:11 Follow up: Response: No adverse reaction                                                  

20:03 Drug: Rocephin (cefTRIAXone) 500 mg Route: IM; Site: right gluteus;                       

20:11 Follow up: Response: No adverse reaction                                                  

                                                                                                  

                                                                                                  

Outcome:                                                                                          

19:46 Discharge ordered by MD.                                                                kb  

20:04 Discharged to home ambulatory.                                                          wh  

20:04 Condition: stable                                                                           

20:04 Discharge instructions given to patient, Instructed on discharge instructions, follow       

      up and referral plans. medication usage, POC Demonstrated understanding of                  

      instructions, follow-up care, medications, POC Prescriptions given X 1.                     

20:12 Patient left the ED.                                                                      

                                                                                                  

Signatures:                                                                                       

Dispatcher [employer]                           EDMS                                                 

Elham Guevara, FNP-C                 FNP-Ckb                                                   

Julissa Romero, RN                      RN   Elie Calero Ajare                                ag5                                                  

                                                                                                  

**************************************************************************************************

## 2022-02-21 ENCOUNTER — HOSPITAL ENCOUNTER (EMERGENCY)
Dept: HOSPITAL 97 - ER | Age: 22
Discharge: LEFT BEFORE DISCHARGE | End: 2022-02-21
Payer: COMMERCIAL

## 2022-02-21 DIAGNOSIS — R07.9: Primary | ICD-10-CM

## 2022-02-21 DIAGNOSIS — F41.8: ICD-10-CM

## 2022-02-21 DIAGNOSIS — I10: ICD-10-CM

## 2022-02-21 DIAGNOSIS — Z88.8: ICD-10-CM

## 2022-02-21 DIAGNOSIS — Z20.822: ICD-10-CM

## 2022-02-21 LAB
ALBUMIN SERPL BCP-MCNC: 4.2 G/DL (ref 3.4–5)
ALP SERPL-CCNC: 97 U/L (ref 45–117)
ALT SERPL W P-5'-P-CCNC: 29 U/L (ref 12–78)
AST SERPL W P-5'-P-CCNC: 17 U/L (ref 15–37)
BUN BLD-MCNC: 13 MG/DL (ref 7–18)
GLUCOSE SERPLBLD-MCNC: 89 MG/DL (ref 74–106)
HCT VFR BLD CALC: 44 % (ref 39.6–49)
INR BLD: 1.03
LYMPHOCYTES # SPEC AUTO: 2.6 K/UL (ref 0.7–4.9)
MAGNESIUM SERPL-MCNC: 2.2 MG/DL (ref 1.8–2.4)
NT-PROBNP SERPL-MCNC: 13 PG/ML (ref ?–125)
PMV BLD: 9.4 FL (ref 7.6–11.3)
POTASSIUM SERPL-SCNC: 3.6 MMOL/L (ref 3.5–5.1)
RBC # BLD: 5.09 M/UL (ref 4.33–5.43)
TROPONIN I SERPL HS-MCNC: 4.3 PG/ML (ref ?–58.9)

## 2022-02-21 PROCEDURE — 85379 FIBRIN DEGRADATION QUANT: CPT

## 2022-02-21 PROCEDURE — 85610 PROTHROMBIN TIME: CPT

## 2022-02-21 PROCEDURE — 84484 ASSAY OF TROPONIN QUANT: CPT

## 2022-02-21 PROCEDURE — 83880 ASSAY OF NATRIURETIC PEPTIDE: CPT

## 2022-02-21 PROCEDURE — 71045 X-RAY EXAM CHEST 1 VIEW: CPT

## 2022-02-21 PROCEDURE — 83735 ASSAY OF MAGNESIUM: CPT

## 2022-02-21 PROCEDURE — 96374 THER/PROPH/DIAG INJ IV PUSH: CPT

## 2022-02-21 PROCEDURE — 85025 COMPLETE CBC W/AUTO DIFF WBC: CPT

## 2022-02-21 PROCEDURE — 36415 COLL VENOUS BLD VENIPUNCTURE: CPT

## 2022-02-21 PROCEDURE — 99284 EMERGENCY DEPT VISIT MOD MDM: CPT

## 2022-02-21 PROCEDURE — 93005 ELECTROCARDIOGRAM TRACING: CPT

## 2022-02-21 PROCEDURE — 80076 HEPATIC FUNCTION PANEL: CPT

## 2022-02-21 PROCEDURE — 80048 BASIC METABOLIC PNL TOTAL CA: CPT

## 2022-02-21 NOTE — RAD REPORT
EXAM DESCRIPTION:  [facility] Single View2/21/2022 6:14 pm

 

CLINICAL HISTORY:  Chest pain

 

COMPARISON:  2020

 

FINDINGS:   The lungs appear clear of acute infiltrate. The heart is normal size

 

IMPRESSION:   No acute abnormalities displayed

## 2022-02-21 NOTE — ER
Nurse's Notes                                                                                     

 [facility]                                                                 

Name: David Allison                                                                              

Age: 21 yrs                                                                                       

Sex: Male                                                                                         

: 2000                                                                                   

MRN: [id number]                                                                                   

Arrival Date: 2022                                                                          

Time: 15:21                                                                                       

Account#: [id number]                                                                            

Bed 8                                                                                             

Private MD:                                                                                       

Diagnosis: Chest pain, unspecified                                                                

                                                                                                  

Presentation:                                                                                     

                                                                                             

15:45 Chief complaint: Patient states: Midsternal chest pain, describes as squeezing, SOB,    ph  

      dizziness, worse w/ activity. Hx of cardiac problems, symptoms started yesterdsay.          

      Coronavirus screen: Vaccine status: Patient reports receiving the 1st dose of the Covid     

      vaccine. Ebola Screen: No symptoms or risks identified at this time. Initial Sepsis         

      Screen: Does the patient meet any 2 criteria? No. Patient's initial sepsis screen is        

      negative. Does the patient have a suspected source of infection? No. Patient's initial      

      sepsis screen is negative. Risk Assessment: Do you want to hurt yourself or someone         

      else? Patient reports no desire to harm self or others. Onset of symptoms was 2022.                                                                                   

15:45 Method Of Arrival: Ambulatory                                                           ph  

15:45 Acuity: JOSEPH 3                                                                           ph  

                                                                                                  

Triage Assessment:                                                                                

22:52 General: Appears in no apparent distress. comfortable, Behavior is cooperative,         st1 

      agitated. Respiratory: No deficits noted. Reports.                                          

23:04 Respiratory: Onset: The symptoms/episode began/occurred today.                          sm5 

23:04 Respiratory: the patient has mild shortness of breath.                                  sm5 

                                                                                                  

Historical:                                                                                       

- Allergies:                                                                                      

15:47 Abilify;                                                                                ph  

15:47 Advair Diskus;                                                                          ph  

15:47 Geodon;                                                                                 ph  

15:47 Seroquel;                                                                               ph  

15:47 Wellbutrin;                                                                             ph  

- Home Meds:                                                                                      

15:47 propranolol 10 mg Oral tab 1 tab daily [Active]; citalopram 20 mg tab 1.5 tabs once     ph  

      daily [Active]; hydroxyzine HCl 25 mg Oral tab 1 tab twice a day [Active]; nifedipine       

      90 mg oral tr24 1 tab [Active]; Strattera 18 mg Oral cap daily [Active];                    

- PMHx:                                                                                           

15:47 ADD/ADHD; Anxiety; Depression; Hypertension; ocd; PTSD; testicular torsion; Heart       ph  

      murmur;                                                                                     

                                                                                                  

- Immunization history:: Adult Immunizations up to date.                                          

- Social history:: Smoking status: Patient denies any tobacco usage or history of.                

                                                                                                  

                                                                                                  

Screenin:41 Abuse screen: Denies threats or abuse. Denies injuries from another. Nutritional        ww  

      screening: No deficits noted. Tuberculosis screening: No symptoms or risk factors           

      identified. Fall Risk None identified.                                                      

                                                                                                  

Assessment:                                                                                       

17:00 General: Appears in no apparent distress. comfortable. Pain: Complains of pain in       ww  

      chest. Neuro: Level of Consciousness is awake, alert, obeys commands, Oriented to           

      person, place, time, situation, Moves all extremities. Gait is steady. Cardiovascular:      

      Capillary refill is > 3 seconds Patient's skin is warm and dry. Rhythm is regular Chest     

      pain is located in chest wall. Respiratory: Airway is patent Respiratory effort is          

      even, unlabored, Respiratory pattern is regular, symmetrical. GI: : No signs and/or       

      symptoms were reported regarding the genitourinary system. Derm: No signs and/or            

      symptoms reported regarding the dermatologic system. Skin is intact, is healthy with        

      good turgor.                                                                                

18:41 Reassessment: Patient appears in no apparent distress at this time. No changes from       

      previously documented assessment. Patient and/or family updated on plan of care and         

      expected duration. Pain level reassessed. Patient is alert, oriented x 3, equal             

      unlabored respirations, skin warm/dry/pink.                                                 

22:49 Reassessment: the patient called on the call light and stated he wanted to leave the    [address] I advised that he would be leaving [place] and reviewed with the patient verbally     

      the risk of heart attack, cardiovascular disease, stroke and death. the patient stated      

      he understood the risks and signed the [place] paperwork.                                       

22:52 Respiratory: Breath sounds are clear bilaterally.                                       st1 

                                                                                                  

Vital Signs:                                                                                      

15:45  / 82; Pulse 73; Resp 18; Temp 98.0; Pulse Ox 100% on R/A; Weight 96.16 kg;       ph  

      Height 5 ft. 9 in. (175.26 cm);                                                             

19:00  / 95; Pulse 87; Resp 16; Pulse Ox 100% on R/A;                                   st1 

20:00  / 88; Pulse 73; Resp 16; Pulse Ox 99% on R/A;                                    st1 

21:00  / 82; Pulse 86; Resp 16; Pulse Ox 97% on R/A;                                    st1 

15:45 Body Mass Index 31.31 (96.16 kg, 175.26 cm)                                             ph  

                                                                                                  

ED Course:                                                                                        

15:21 Patient arrived in ED.                                                                  ds1 

15:47 Triage completed.                                                                       ph  

15:48 Arm band placed on.                                                                     ph  

16:51 Joey Galeano MD is Attending Physician.                                             lg 

17:00 Cardiac monitor on. Pulse ox on. NIBP on.                                               ww  

17:00 Inserted saline lock: 20 gauge in right antecubital area, using aseptic technique.      ww  

      Blood collected.                                                                            

17:06 Hugo Moreno, RN is Primary Nurse.                                                      jl7 

18:14 XRAY Chest (1 view) In Process Unspecified.                                             EDMS

18:39 transfer initiated to [facility] by Dr galeano.                                  bd  

18:41 Patient has correct armband on for positive identification. Bed in low position. Call   ww  

      light in reach. Side rails up X 1.                                                          

19:28 Primary Nurse role handed off by Hugo Moreno, VINOD                                       mw2 

22:46 Lorenza Miguel, RN is Primary Nurse.                                                   st1 

22:52 No provider procedures requiring assistance completed. IV discontinued, intact,         st1 

      bleeding controlled, No redness/swelling at site. Pressure dressing applied.                

                                                                                                  

Administered Medications:                                                                         

17:31 Drug: Aspirin Chewable Tablet 162 mg Route: PO;                                         ww  

17:31 Drug: Pepcid (famotidine) 20 mg Route: IVP; Site: left antecubital;                     ww  

                                                                                                  

                                                                                                  

Outcome:                                                                                          

17:43 ER care complete, transfer ordered by MD.                                               [facility] 

22:52 AMA AMA form signed                                                                     st1 

22:52 Condition: unchanged                                                                        

22:52 Instructed on AMA                                                                           

23:23 Patient left the ED.                                                                    bb  

                                                                                                  

Signatures:                                                                                       

Dispatcher [employer]                           EDMS                                                 

Franci Cordero                                                                                 

Joey Galeano MD MD cha Sanford, Demi                                ds1                                                  

Kaylan Johnson RN                     RN   bb                                                   

Emma Denis RN                      RN                                                      

Hugo Moreno, RN                        RN   jl7                                                  

Aleksandra Quezada                            mw2                                                  

Melissa Plummer RN RN sm5 Wood, Whitney, RN RN                                                      

Lorenza Miguel, VINOD BROCK   st1                                                  

                                                                                                  

**************************************************************************************************

## 2022-02-21 NOTE — XMS REPORT
Continuity of Care Document

                          Created on:2022



Patient:DAVID THAYER

Sex:Male

:2000

External Reference #:[id number]





Demographics







                          Address                   [address]



                                                    [address] 



                                                    [place] [address]

 

                          Home Phone                [phone]

 

                          Mobile Phone              [phone]

 

                          Email Address             DECLINED 2021

 

                          Preferred Language        English

 

                          Marital Status            Unknown

 

                          Scientology Affiliation     Unknown

 

                          Race                      Unknown

 

                          Additional Race(s)        White

 

                          Ethnic Group              Unknown









Author







                          Organization              [facility]

t

 

                          Address                   [address]



                                                    [place] [address]

 

                          Phone                     [phone]









Support







                Name            Relationship    Address         Phone

 

                Flaca Camarena      Uncle           [address]  [phone]



                                                [place] [address] 

 

                FLACA          M               [address]    Unavailable



                                                [place] [address] 









Care Team Providers







                    Name                Role                Phone

 

                    Kenia             Primary Care Physician [phone]

 

                    EBONY              Attending Clinician Unavailable

 

                    Ebony GERMAN         Attending Clinician [phone]

 

                    Pob,  Lab Main      Attending Clinician Unavailable

 

                    Doctor Unassigned,  Name Attending Clinician Unavailable

 

                    Vtc-Lab             Attending Clinician Unavailable

 

                    MARK              Attending Clinician Unavailable

 

                    Erick ZHENG         Attending Clinician [phone]

 

                    ERICK            Attending Clinician Unavailable

 

                    Oliver ZHENG          Attending Clinician [phone]

 

                    OLIVER             Attending Clinician Unavailable

 

                    Gramm FNP,  A       Attending Clinician [phone]

 

                    Provider,  Urgent Care Attending Clinician Unavailable

 

                    Unknown             Attending Clinician Unavailable

 

                    Anene FNP           Attending Clinician [phone]

 

                    ANENE               Attending Clinician Unavailable

 

                    Kalia ZHENG           Attending Clinician [phone]

 

                    UNKNOWN             Attending Clinician Unavailable

 

                    Urology,  Pedi      Attending Clinician Unavailable

 

                    Olga ZHENG    Attending Clinician [phone]









Payers







           Payer Name Policy Type Policy Number Effective Date Expiration Date KENIA CRAWFORDS            [id number]  2016            



           HEALTH                           00:00:00              







Problems







       Condition Condition Condition Status Onset  Resolution Last   Treating Co

mments 

Source



       Name   Details Category        Date   Date   Treatment Clinician        



                                                 Date                 

 

       Testicular Testicular Disease Active                              U

nivers



       torsion torsion               4-26                               ity of



                                   00:00:                             [place]



                                   00                                 Medical



                                                                      Branch







Allergies, Adverse Reactions, Alerts







       Allergy Allergy Status Severity Reaction(s) Onset  Inactive Treating Comm

ents 

Source



       Name   Type                        Date   Date   Clinician        

 

       Aripipra Propensi Active        Shortness of                Informe

d Univers



       zole   ty to                Breath 5-04                 by parent ity of



              adverse                      00:00:                      [place]



              reaction                      00                          Medical



              s to                                                    Branch



              drug                                                    

 

       Fluticas Propensi Active        Shortness of                Informe

d Univers



       one    ty to                Breath 5-04                 by parent ity of



       Propion- adverse                      00:00:                      [place]



       Salmeter reaction                      00                          Medica

l



       ol     s to                                                    Branch



              drug                                                    

 

       Ziprasid Propensi Active        Shortness of                Informe

d Univers



       one Hcl ty to                Breath 5-04                 by parent ity of



              adverse                      00:00:                      [place]



              reaction                      00                          Medical



              s to                                                    Branch



              drug                                                    

 

       Quetiapi Propensi Active        Shortness of                Informe

d Univers



       ne     ty to                Breath 5-04                 by parent ity of



       Fumarate adverse                      00:00:                      [place]



              reaction                      00                          Medical



              s to                                                    Branch



              drug                                                    

 

       Bupropio Propensi Active        Shortness of                Informe

d Univers



       n      ty to                Breath 5-04                 by parent ity of



              adverse                      00:00:                      [place]



              reaction                      00                          Medical



              s to                                                    Branch



              drug                                                    

 

       ARIPIPRA DRUG   Active Med    Anxiety 0                      Univers



       ZOLE   INGREDI                      5-04                        ity of



                                          00:00:                      [place]



                                          00                          Medical



                                                                      Branch

 

       FLUTICAS DRUG   Active Med    SOB    -0                      Univers



       ONE                                5-04                        ity of



       PROPION-                             00:00:                      [place]



       SALMETER                             00                          Medical



                                                                    Branch

 

       ZIPRASID DRUG   Active Med    Anxiety 2015-0                      Univers



       ONE HCL INGREDI                      5-04                        ity of



                                          00:00:                      [place]



                                          00                          [facility]

 

       QUETIAPI DRUG   Active Med    Anaphylaxis 2015-0                      Uni

vers



       NE     INGREDI                      5-04                        ity of



       FUMARATE                             00:00:                      [place]



                                          00                          [facility]

 

       BUPROPIO DRUG   Active Med    Anxiety -0                      Univers



       N      INGREDI                      5-04                        ity of



                                          00:00:                      [place]



                                          00                          [facility]







Social History







           Social Habit Start Date Stop Date  Quantity   Comments   Source

 

           Exposure to                       Not sure              [facility]

 [place]



           SARS-CoV-2 (event)                                             Medica

l [place]

 

           Tobacco use and 2021 Never used            [facility]



           exposure   00:00:00   00:00:00                         [facility]

 

           Alcohol intake 2021 0 /d                  [facility]

 [facility] [place]



                      00:00:00   00:00:00                         [facility]

 

           Sex Assigned At 2000                       [facility]



           Birth      00:00:00   00:00:00                         Medical [facility]









                Smoking Status  Start Date      Stop Date       Source

 

                Never smoker                                    [facility]







Medications







       Ordered Filled Start  Stop   Current Ordering Indication Dosage Frequency

 Signature

                    Comments            Components          Source



     Medication Medication Date Date Medication? Clinician                (SIG) 

          



     Name Name                                                   

 

     dexamethaso      2020- No             8mg                      Unive

rs



     ne                                                 ity of



     (DECADRON      00:30: 23:34                                         [place]



     PHOSPHATE)      00   :00                                          Medical



     injection 8                                                        Branch



     mg                                                          

 

     dexamethaso      2020- No             8mg       8 mg, IV           U

nivers



     ne                                  Push,           ity of



     (DECADRON      00:30: 23:34                          ONCE, 1           [place]

s



     PHOSPHATE)      00   :00                           dose, Tue           Medi

maria a



     injection 8                                         20 at           Br

anch



     mg                                           1830,           



                                                  Routine           

 

     cephALEXin      2020- No        [id number] 500mg      Take 1         

  [facility]



     (KEFLEX)      16                          capsule by           ity 

of



     500 mg      00:00: 05:59                          mouth 2           [place]



     capsule      00   :00                           (two)           Medical



                                                  times           Branch



                                                  daily for           



                                                  7 days.           

 

     diphenhydrA      2020- No        [id number] 25mg      Take 1         

  [facility]



     MINE      14                          capsule by           ity of



     (BENADRYL)      00:00: 05:59                          mouth           [place]



     25 mg      00   :00                           every 6           Medical



     capsule                                         (six)           Branch



                                                  hours as           



                                                  needed for           



                                                  Itching or           



                                                  Allergies           



                                                  for up to           



                                                  5 days.           

 

     hydrOXYzine      2020-0      Yes            25mg      Take 25 mg           

Univers



     (ATARAX) 25      8-05                               by mouth           ity 

of



     mg tablet      20:44:                               at             [place]



               36                                 bedtime.           Medical



                                                                 Branch

 

     propranolol      2020-0      Yes            10mg      Take 10 mg           

Univers



     (INDERAL)      8-05                               by mouth 2           ity 

of



     10 mg      20:44:                               (two)           [place]



     tablet      36                                 times           Medical



                                                  daily.           Branch

 

     NIFEdipine      2020-0      Yes       [id number] 30mg      Take 30 mg        

   [facility]



     XL        8-05                               by mouth           ity of



     (NIFEDICAL      20:44:                               daily.           [place]



     XL) 30 mg      36                                                Medical



     24 hr                                                        Branch



     tablet                                                        

 

     citalopram      2020-0      Yes            20mg      Take 20 mg           U

nivers



     20 mg      8-05                               by mouth           ity of



     tablet      20:44:                               daily.           [place]



               36                                                Medical



                                                                 Branch

 

     hydrOXYzine      2020-0      Yes            25mg      Take 25 mg           

Univers



     (ATARAX) 25      8-05                               by mouth           ity 

of



     mg tablet      20:44:                               at             [place]



               36                                 bedtime.           Medical



                                                                 Branch

 

     propranolol      2020-0      Yes            10mg      Take 10 mg           

Univers



     (INDERAL)      8-05                               by mouth 2           ity 

of



     10 mg      20:44:                               (two)           [place]



     tablet      36                                 times           Medical



                                                  daily.           Branch

 

     NIFEdipine      2020-0      Yes       [id number] 30mg      Take 30 mg        

   Univers



     XL        8-05                               by mouth           ity of



     (NIFEDICAL      20:44:                               daily.           [place]



     XL) 30 mg      36                                                Medical



     24 hr                                                        Branch



     tablet                                                        

 

     citalopram      2020-0      Yes            20mg      Take 20 mg           U

nivers



     20 mg      8-05                               by mouth           ity of



     tablet      20:44:                               daily.           [place]



               36                                                Medical



                                                                 Branch

 

     hydrOXYzine      2020-0      Yes            25mg      Take 25 mg           

Univers



     (ATARAX) 25      8-05                               by mouth           ity 

of



     mg tablet      20:44:                               at             [place]



               36                                 bedtime.           Medical



                                                                 Branch

 

     propranolol      2020-0      Yes            10mg      Take 10 mg           

Univers



     (INDERAL)      8-05                               by mouth 2           ity 

of



     10 mg      20:44:                               (two)           [place]



     tablet      36                                 times           Medical



                                                  daily.           Branch

 

     NIFEdipine      2020-0      Yes       [id number] 30mg      Take 30 mg        

   [facility]



     XL        8-05                               by mouth           ity of



     (NIFEDICAL      20:44:                               daily.           [place]



     XL) 30 mg      36                                                Medical



     24 hr                                                        Branch



     tablet                                                        

 

     citalopram      2020-0      Yes            20mg      Take 20 mg           U

nivers



     20 mg      8-05                               by mouth           ity of



     tablet      20:44:                               daily.           [place]



               36                                                Medical



                                                                 Branch

 

     hydrOXYzine      2020-0      Yes            25mg      Take 25 mg           

Univers



     (ATARAX) 25      8-05                               by mouth           ity 

of



     mg tablet      20:44:                               at             [place]



               36                                 bedtime.           Medical



                                                                 Branch

 

     propranolol      2020-0      Yes            10mg      Take 10 mg           

Univers



     (INDERAL)      8-05                               by mouth 2           ity 

of



     10 mg      20:44:                               (two)           [place]



     tablet      36                                 times           Medical



                                                  daily.           Branch

 

     NIFEdipine      2020-0      Yes       [id number] 30mg      Take 30 mg        

   [facility]



     XL        8-05                               by mouth           ity of



     (NIFEDICAL      20:44:                               daily.           [place]



     XL) 30 mg      36                                                Medical



     24 hr                                                        Branch



     tablet                                                        

 

     citalopram      2020-0      Yes            20mg      Take 20 mg           U

nivers



     20 mg      8-05                               by mouth           ity of



     tablet      20:44:                               daily.           [place]



               36                                                Medical



                                                                 Branch

 

     hydrOXYzine      2020-0      Yes            25mg      Take 25 mg           

Univers



     (ATARAX) 25      8-05                               by mouth           ity 

of



     mg tablet      20:44:                               at             [place]



               36                                 bedtime.           Medical



                                                                 Branch

 

     propranolol      2020-0      Yes            10mg      Take 10 mg           

Univers



     (INDERAL)      8-05                               by mouth 2           ity 

of



     10 mg      20:44:                               (two)           [place]



     tablet      36                                 times           Medical



                                                  daily.           Branch

 

     NIFEdipine      2020-0      Yes       [id number] 30mg      Take 30 mg        

   [facility]



     XL        8-05                               by mouth           ity of



     (NIFEDICAL      20:44:                               daily.           [place]



     XL) 30 mg      36                                                Medical



     24 hr                                                        Branch



     tablet                                                        

 

     citalopram      2020-0      Yes            20mg      Take 20 mg           U

nivers



     20 mg      8-05                               by mouth           ity of



     tablet      20:44:                               daily.           [place]



               36                                                Medical



                                                                 Branch

 

     hydrOXYzine      2020-0      Yes            25mg      Take 25 mg           

Univers



     (ATARAX) 25      8-05                               by mouth           ity 

of



     mg tablet      20:44:                               at             [place]



               36                                 bedtime.           Medical



                                                                 Branch

 

     propranolol      2020-0      Yes            10mg      Take 10 mg           

Univers



     (INDERAL)      8-05                               by mouth 2           ity 

of



     10 mg      20:44:                               (two)           [place]



     tablet      36                                 times           Medical



                                                  daily.           Branch

 

     NIFEdipine      2020-0      Yes       [id number] 30mg      Take 30 mg        

   [facility]



     XL        8-05                               by mouth           ity of



     (NIFEDICAL      20:44:                               daily.           [place]



     XL) 30 mg      36                                                Medical



     24 hr                                                        Branch



     tablet                                                        

 

     citalopram      2020-0      Yes            20mg      Take 20 mg           U

nivers



     20 mg      8-05                               by mouth           ity of



     tablet      20:44:                               daily.           [place]



               36                                                Medical



                                                                 Branch

 

     hydrOXYzine      2020-0      Yes            25mg      Take 25 mg           

Univers



     (ATARAX) 25      8-05                               by mouth           ity 

of



     mg tablet      20:44:                               at             [place]



               36                                 bedtime.           Medical



                                                                 Branch

 

     propranolol      2020-0      Yes            10mg      Take 10 mg           

Univers



     (INDERAL)      8-05                               by mouth 2           ity 

of



     10 mg      20:44:                               (two)           [place]



     tablet      36                                 times           Medical



                                                  daily.           Branch

 

     NIFEdipine      2020-0      Yes       [id number] 30mg      Take 30 mg        

   [facility]



     XL        8-05                               by mouth           ity of



     (NIFEDICAL      20:44:                               daily.           [place]



     XL) 30 mg      36                                                Medical



     24 hr                                                        Branch



     tablet                                                        

 

     citalopram      2020-0      Yes            20mg      Take 20 mg           U

nivers



     20 mg      8-05                               by mouth           ity of



     tablet      20:44:                               daily.           [place]



               36                                                Medical



                                                                 Branch

 

     hydrOXYzine      2020-0      Yes            25mg      Take 25 mg           

[facility]



     (ATARAX) 25      8-05                               by mouth           [facility] 

of



     mg tablet      20:44:                               at             [place]



               36                                 bedtime.           Medical



                                                                 Branch

 

     propranolol      2020-0      Yes            10mg      Take 10 mg           

[facility]



     (INDERAL)      8-05                               by mouth 2           [facility] 

of



     10 mg      20:44:                               (two)           [place]



     tablet      36                                 times           Medical



                                                  daily.           Branch

 

     NIFEdipine      2020-0      Yes       [id number] 30mg      Take 30 mg        

   [facility]



     XL        8-05                               by mouth           ity of



     (NIFEDICAL      20:44:                               daily.           [place]



     XL) 30 mg      36                                                Medical



     24 hr                                                        Branch



     tablet                                                        

 

     citalopram      2020-0      Yes            20mg      Take 20 mg           [facility]

[facility]



     20 mg      8-05                               by mouth           ity of



     tablet      20:44:                               daily.           [place]



               36                                                Medical



                                                                 Branch

 

     hydrOXYzine      2020-0      Yes            25mg      Take 25 mg           

[facility]



     (ATARAX) 25      8-05                               by mouth           [facility] 

of



     mg tablet      20:44:                               at             [place]



               36                                 bedtime.           Medical



                                                                 Branch

 

     propranolol      2020-0      Yes            10mg      Take 10 mg           

[facility]



     (INDERAL)      8-05                               by mouth 2           [facility] 

of



     10 mg      20:44:                               (two)           [place]



     tablet      36                                 times           Medical



                                                  daily.           Branch

 

     NIFEdipine      2020-0      Yes       [id number] 30mg      Take 30 mg        

   [facility]



     XL        8-05                               by mouth           ity of



     (NIFEDICAL      20:44:                               daily.           [place]



     XL) 30 mg      36                                                Medical



     24 hr                                                        Branch



     tablet                                                        

 

     citalopram      2020-0      Yes            20mg      Take 20 mg           [facility]

[facility]



     20 mg      8-05                               by mouth           ity of



     tablet      20:44:                               daily.           [place]



               36                                                Medical



                                                                 Branch

 

     hydrOXYzine      2020-0      Yes            25mg      Take 25 mg           

[facility]



     (ATARAX) 25      8-05                               by mouth           [facility] 

of



     mg tablet      20:44:                               at             [place]



               36                                 bedtime.           Medical



                                                                 Branch

 

     propranolol      2020-0      Yes            10mg      Take 10 mg           

[facility]



     (INDERAL)      8-05                               by mouth 2           ity 

of



     10 mg      20:44:                               (two)           [place]



     tablet      36                                 times           Medical



                                                  daily.           Branch

 

     NIFEdipine      2020-0      Yes       [id number] 30mg      Take 30 mg        

   [facility]



     XL        8-05                               by mouth           ity of



     (NIFEDICAL      20:44:                               daily.           [place]



     XL) 30 mg      36                                                Medical



     24 hr                                                        Branch



     tablet                                                        

 

     citalopram      2020-0      Yes            20mg      Take 20 mg           [facility]

[facility]



     20 mg      8-05                               by mouth           ity of



     tablet      20:44:                               daily.           [place]



               36                                                Medical



                                                                 Branch

 

     hydrOXYzine      2020-0      Yes            25mg      Take 25 mg           

[facility]



     (ATARAX) 25      8-05                               by mouth           ity 

of



     mg tablet      20:44:                               at             [place]



               36                                 bedtime.           Medical



                                                                 Branch

 

     propranolol      2020-0      Yes            10mg      Take 10 mg           

[facility]



     (INDERAL)      8-05                               by mouth 2           ity 

of



     10 mg      20:44:                               (two)           [place]



     tablet      36                                 times           Medical



                                                  daily.           Branch

 

     NIFEdipine      2020-0      Yes       [id number] 30mg      Take 30 mg        

   [facility]



     XL        8-05                               by mouth           ity of



     (NIFEDICAL      20:44:                               daily.           [place]



     XL) 30 mg      36                                                Medical



     24 hr                                                        Branch



     tablet                                                        

 

     citalopram      2020-0      Yes            20mg      Take 20 mg           [facility]

[facility]



     20 mg      8-05                               by mouth           ity of



     tablet      20:44:                               daily.           [place]



               36                                                Medical



                                                                 Branch

 

     hydrOXYzine      2020-0      Yes            25mg      Take 25 mg           

[facility]



     (ATARAX) 25      8-05                               by mouth           ity 

of



     mg tablet      20:44:                               at             [place]



               36                                 bedtime.           Medical



                                                                 Branch

 

     propranolol      2020-0      Yes            10mg      Take 10 mg           

[facility]



     (INDERAL)      8-05                               by mouth 2           ity 

of



     10 mg      20:44:                               (two)           [place]



     tablet      36                                 times           Medical



                                                  daily.           Branch

 

     NIFEdipine      2020-0      Yes       [id number] 30mg      Take 30 mg        

   [facility]



     XL        8-05                               by mouth           ity of



     (NIFEDICAL      20:44:                               daily.           [place]



     XL) 30 mg      36                                                Medical



     24 hr                                                        Branch



     tablet                                                        

 

     citalopram      2020-0      Yes            20mg      Take 20 mg           [facility]

nivers



     20 mg      8-05                               by mouth           ity of



     tablet      20:44:                               daily.           [place]



               36                                                Medical



                                                                 Branch

 

     hydrOXYzine      2020-0      Yes            25mg      Take 25 mg           

[facility]



     (ATARAX) 25      8-05                               by mouth           [facility] 

of



     mg tablet      20:44:                               at             [place]



               36                                 bedtime.           Medical



                                                                 Branch

 

     propranolol      2020-0      Yes            10mg      Take 10 mg           

[facility]



     (INDERAL)      8-05                               by mouth 2           ity 

of



     10 mg      20:44:                               (two)           [place]



     tablet      36                                 times           Medical



                                                  daily.           Branch

 

     NIFEdipine      2020-0      Yes       [id number] 30mg      Take 30 mg        

   [facility]



     XL        8-05                               by mouth           ity of



     (NIFEDICAL      20:44:                               daily.           [place]



     XL) 30 mg      36                                                Medical



     24 hr                                                        Branch



     tablet                                                        

 

     citalopram      2020-0      Yes            20mg      Take 20 mg           [facility]

nivers



     20 mg      8-05                               by mouth           ity of



     tablet      20:44:                               daily.           [place]



               36                                                Medical



                                                                 Branch

 

     hydrOXYzine      2020-0      Yes            25mg      Take 25 mg           

[facility]



     (ATARAX) 25      8-05                               by mouth           [facility] 

of



     mg tablet      20:44:                               at             [place]



               36                                 bedtime.           Medical



                                                                 Branch

 

     propranolol      2020-0      Yes            10mg      Take 10 mg           

[facility]



     (INDERAL)      8-05                               by mouth 2           ity 

of



     10 mg      20:44:                               (two)           [place]



     tablet      36                                 times           Medical



                                                  daily.           Branch

 

     NIFEdipine      2020-0      Yes       [id number] 30mg      Take 30 mg        

   [facility]



     XL        8-05                               by mouth           ity of



     (NIFEDICAL      20:44:                               daily.           [place]



     XL) 30 mg      36                                                Medical



     24 hr                                                        Branch



     tablet                                                        

 

     citalopram      2020-0      Yes            20mg      Take 20 mg           [facility]

nivers



     20 mg      8-05                               by mouth           ity of



     tablet      20:44:                               daily.           [place]



               36                                                Medical



                                                                 Branch

 

     hydrOXYzine      2020-0      Yes            25mg      Take 25 mg           

[facility]



     (ATARAX) 25      8-05                               by mouth           ity 

of



     mg tablet      20:44:                               at             [place]



               36                                 bedtime.           Medical



                                                                 Branch

 

     propranolol      2020-0      Yes            10mg      Take 10 mg           

[facility]



     (INDERAL)      8-05                               by mouth 2           ity 

of



     10 mg      20:44:                               (two)           [place]



     tablet      36                                 times           Medical



                                                  daily.           Branch

 

     NIFEdipine      2020-0      Yes       [id number] 30mg      Take 30 mg        

   [facility]



     XL        8-05                               by mouth           ity of



     (NIFEDICAL      20:44:                               daily.           [place]



     XL) 30 mg      36                                                Medical



     24 hr                                                        Branch



     tablet                                                        

 

     citalopram      2020-0      Yes            20mg      Take 20 mg           [facility]

nivers



     20 mg      8-05                               by mouth           ity of



     tablet      20:44:                               daily.           [place]



               36                                                Medical



                                                                 Branch

 

     hydrOXYzine      2020-0      Yes            25mg      Take 25 mg           

[facility]



     (ATARAX) 25      8-05                               by mouth           ity 

of



     mg tablet      20:44:                               at             [place]



               36                                 bedtime.           Medical



                                                                 Branch

 

     propranolol      2020-0      Yes            10mg      Take 10 mg           

[facility]



     (INDERAL)      8-05                               by mouth 2           ity 

of



     10 mg      20:44:                               (two)           [place]



     tablet      36                                 times           Medical



                                                  daily.           Branch

 

     NIFEdipine      2020-0      Yes       [id number] 30mg      Take 30 mg        

   [facility]



     XL        8-05                               by mouth           ity of



     (NIFEDICAL      20:44:                               daily.           [place]



     XL) 30 mg      36                                                Medical



     24 hr                                                        [facility]



     tablet                                                        

 

     citalopram      2020-0      Yes            20mg      Take 20 mg           [facility]

nivers



     20 mg      8-05                               by mouth           ity of



     tablet      20:44:                               daily.           [place]



               36                                                Medical



                                                                 Branch

 

     hydrOXYzine      2020-0      Yes            25mg      Take 25 mg           

[facility]



     (ATARAX) 25      8-05                               by mouth           ity 

of



     mg tablet      20:44:                               at             [place]



               36                                 bedtime.           Medical



                                                                 Branch

 

     propranolol      2020-0      Yes            10mg      Take 10 mg           

[facility]



     (INDERAL)      8-05                               by mouth 2           ity 

of



     10 mg      20:44:                               (two)           [place]



     tablet      36                                 times           Medical



                                                  daily.           Branch

 

     NIFEdipine      2020-0      Yes       [id number] 30mg      Take 30 mg        

   [facility]



     XL        8-05                               by mouth           ity of



     (NIFEDICAL      20:44:                               daily.           [place]



     XL) 30 mg      36                                                Medical



     24 hr                                                        [facility]



     tablet                                                        

 

     citalopram      2020-0      Yes            20mg      Take 20 mg           [facility]

nivers



     20 mg      8-05                               by mouth           ity of



     tablet      20:44:                               daily.           [place]



               36                                                Medical



                                                                 Branch

 

     hydrOXYzine      2020-0      Yes            25mg      Take 25 mg           

[facility]



     (ATARAX) 25      8-05                               by mouth           ity 

of



     mg tablet      20:44:                               at             [place]



               36                                 bedtime.           Medical



                                                                 Branch

 

     propranolol      2020-0      Yes            10mg      Take 10 mg           

[facility]



     (INDERAL)      8-05                               by mouth 2           ity 

of



     10 mg      20:44:                               (two)           [place]



     tablet      36                                 times           Medical



                                                  daily.           Branch

 

     NIFEdipine      2020-0      Yes       [id number] 30mg      Take 30 mg        

   [facility]



     XL        8-05                               by mouth           ity of



     (NIFEDICAL      20:44:                               daily.           [place]



     XL) 30 mg      36                                                Medical



     24 hr                                                        [facility]



     tablet                                                        

 

     citalopram      2020-0      Yes            20mg      Take 20 mg           U

nivers



     20 mg      8-05                               by mouth           ity of



     tablet      20:44:                               daily.           [place]



               36                                                Medical



                                                                 Branch

 

     hydrOXYzine      2020-0      Yes            25mg      Take 25 mg           

[facility]



     (ATARAX) 25      8-05                               by mouth           ity 

of



     mg tablet      20:44:                               at             [place]



               36                                 bedtime.           Medical



                                                                 Branch

 

     propranolol      2020-0      Yes            10mg      Take 10 mg           

[facility]



     (INDERAL)      8-05                               by mouth 2           ity 

of



     10 mg      20:44:                               (two)           [place]



     tablet      36                                 times           Medical



                                                  daily.           Branch

 

     NIFEdipine      2020-0      Yes       [id number] 30mg      Take 30 mg        

   [facility]



     XL        8-05                               by mouth           ity of



     (NIFEDICAL      20:44:                               daily.           [place]



     XL) 30 mg      36                                                Medical



     24 hr                                                        [facility]



     tablet                                                        

 

     citalopram      2020-0      Yes            20mg      Take 20 mg           U

nivers



     20 mg      8-05                               by mouth           ity of



     tablet      20:44:                               daily.           [place]



               36                                                Medical



                                                                 Branch

 

     hydrOXYzine      2020-0      Yes            25mg      Take 25 mg           

[facility]



     (ATARAX) 25      8-05                               by mouth           ity 

of



     mg tablet      20:44:                               at             [place]



               36                                 bedtime.           Medical



                                                                 Branch

 

     propranolol      2020-0      Yes            10mg      Take 10 mg           

[facility]



     (INDERAL)      8-05                               by mouth 2           ity 

of



     10 mg      20:44:                               (two)           [place]



     tablet      36                                 times           Medical



                                                  daily.           Branch

 

     NIFEdipine      2020-0      Yes       [id number] 30mg      Take 30 mg        

   [facility]



     XL        8-05                               by mouth           ity of



     (NIFEDICAL      20:44:                               daily.           [place]



     XL) 30 mg      36                                                Medical



     24 hr                                                        [facility]



     tablet                                                        

 

     citalopram      2020-0      Yes            20mg      Take 20 mg           U

nivers



     20 mg      8-05                               by mouth           ity of



     tablet      20:44:                               daily.           [place]



               36                                                Medical



                                                                 Branch

 

     hydrOXYzine      2020-0      Yes            25mg      Take 25 mg           

[facility]



     (ATARAX) 25      8-05                               by mouth           ity 

of



     mg tablet      20:44:                               at             [place]



               36                                 bedtime.           Medical



                                                                 Branch

 

     propranolol      2020-0      Yes            10mg      Take 10 mg           

[facility]



     (INDERAL)      8-05                               by mouth 2           ity 

of



     10 mg      20:44:                               (two)           [place]



     tablet      36                                 times           Medical



                                                  daily.           Branch

 

     NIFEdipine      2020-0      Yes       [id number] 30mg      Take 30 mg        

   [facility]



     XL        8-05                               by mouth           ity of



     (NIFEDICAL      20:44:                               daily.           [place]



     XL) 30 mg      36                                                Medical



     24 hr                                                        [facility]



     tablet                                                        

 

     citalopram      2020-0      Yes            20mg      Take 20 mg           U

nivers



     20 mg      8-05                               by mouth           ity of



     tablet      20:44:                               daily.           [place]



               36                                                Medical



                                                                 Branch

 

     hydrOXYzine      2020-0      Yes            25mg      Take 25 mg           

[facility]



     (ATARAX) 25      8-05                               by mouth           [facility] 

of



     mg tablet      15:44:                               at             [place]



               36                                 bedtime.           Medical



                                                                 Branch

 

     propranolol      2020-0      Yes            10mg      Take 10 mg           

[facility]



     (INDERAL)      8-05                               by mouth 2           [facility] 

of



     10 mg      15:44:                               (two)           [place]



     tablet      36                                 times           Medical



                                                  daily.           Branch

 

     NIFEdipine      2020-0      Yes       [id number] 30mg      Take 30 mg        

   [facility]



     XL        8-05                               by mouth           [facility] of



     (NIFEDICAL      15:44:                               daily.           [place]



     XL) 30 mg      36                                                Medical



     24 hr                                                        Branch



     tablet                                                        

 

     citalopram      2020-0      Yes            20mg      Take 20 mg           [facility]

nivers



     20 mg      8-05                               by mouth           [facility] of



     tablet      15:44:                               daily.           [place]



               36                                                Medical



                                                                 Branch

 

     hydrOXYzine      2020-0      Yes            25mg      Take 25 mg           

[facility]



     (ATARAX) 25      8-05                               by mouth           [facility] 

of



     mg tablet      15:44:                               at             [place]



               36                                 bedtime.           Medical



                                                                 Branch

 

     propranolol      2020-0      Yes            10mg      Take 10 mg           

[facility]



     (INDERAL)      8-05                               by mouth 2           [facility] 

of



     10 mg      15:44:                               (two)           [place]



     tablet      36                                 times           Medical



                                                  daily.           Branch

 

     NIFEdipine      2020-0      Yes       [id number] 30mg      Take 30 mg        

   [facility]



     XL        8-05                               by mouth           [facility] of



     (NIFEDICAL      15:44:                               daily.           [place]



     XL) 30 mg      36                                                Medical



     24 hr                                                        Branch



     tablet                                                        

 

     citalopram      2020-0      Yes            20mg      Take 20 mg           [facility]

[facility]ivers



     20 mg      8-05                               by mouth           ity of



     tablet      15:44:                               daily.           [place]



               36                                                Medical



                                                                 Branch

 

     hydrOXYzine      2020-0      Yes            25mg      Take 25 mg           

[facility]



     (ATARAX) 25      8-05                               by mouth           [facility] 

of



     mg tablet      15:44:                               at             [place]



               36                                 bedtime.           Medical



                                                                 Branch

 

     propranolol      2020-0      Yes            10mg      Take 10 mg           

[facility]



     (INDERAL)      8-05                               by mouth 2           [facility] 

of



     10 mg      15:44:                               (two)           [place]



     tablet      36                                 times           Medical



                                                  daily.           Branch

 

     NIFEdipine      2020-0      Yes       [id number] 30mg      Take 30 mg        

   [facility]



     XL        8-05                               by mouth           [facility] of



     (NIFEDICAL      15:44:                               daily.           [place]



     XL) 30 mg      36                                                Medical



     24 hr                                                        [facility]



     tablet                                                        

 

     citalopram      2020-0      Yes            20mg      Take 20 mg           [facility]

n[facility]



     20 mg      8-05                               by mouth           [facility] of



     tablet      15:44:                               daily.           [place]



               36                                                Medical



                                                                 Branch

 

     hydrOXYzine      2020-0      Yes            25mg      Take 25 mg           

[facility]



     (ATARAX) 25      8-05                               by mouth           [facility] 

of



     mg tablet      15:44:                               at             [place]



               36                                 bedtime.           Medical



                                                                 Branch

 

     propranolol      2020-0      Yes            10mg      Take 10 mg           

[facility]



     (INDERAL)      8-05                               by mouth 2           [facility] 

of



     10 mg      15:44:                               (two)           [place]



     tablet      36                                 times           Medical



                                                  daily.           Branch

 

     NIFEdipine      2020-0      Yes       [id number] 30mg      Take 30 mg        

   [facility]



     XL        8-05                               by mouth           [facility] of



     (NIFEDICAL      15:44:                               daily.           [place]



     XL) 30 mg      36                                                Medical



     24 hr                                                        [facility]



     tablet                                                        

 

     citalopram      2020-0      Yes            20mg      Take 20 mg           [facility]

n[facility]



     20 mg      8-05                               by mouth           [facility] of



     tablet      15:44:                               daily.           [place]



               36                                                Medical



                                                                 Branch

 

     sulfamethox      2020-0 2020- No        [id number] 1{tbl}      Take 1       

    [facility]



     azole-trime      8-05 08-16                          tablet by           it

y of



     thoprim      00:00: 04:59                          mouth 2           [place]



     (BACTRIM      00   :00                           (two)           Medical



     DS) [id number]                                         times           [facility]



     mg per                                         daily for           



     tablet                                         10 days.           

 

     hydrOXYzine      2020-0      Yes            25mg      Take 25 mg           

[facility]



     (ATARAX) 25      3-14                               by mouth           [facility] 

of



     mg tablet      00:24:                               at             [place]



               49                                 bedtime.           Medical



                                                                 Branch

 

     propranolol      2020-0      Yes            10mg      Take 10 mg           

[facility]



     (INDERAL)      3-14                               by mouth 2           ity 

of



     10 mg      00:24:                               (two)           [place]



     tablet      49                                 times           Medical



                                                  daily.           [facility]anch

 

     NIFEdipine      2020-0      Yes       [id number] 30mg      Take 30 mg        

   [facility]



     XL        3-14                               by mouth           [facility] of



     (NIFEDICAL      00:24:                               daily.           [place]



     XL) 30 mg      49                                                Medical



     24 hr                                                        [facility]



     tablet                                                        

 

     citalopram      2020-0      Yes            20mg      Take 20 mg           [facility]

nivers



     20 mg      3-14                               by mouth           [facility] of



     tablet      00:24:                               daily.           [place]



               49                                                Medical



                                                                 Br[facility]

 

     hydrOXYzine      2020-0      Yes            25mg      Take 25 mg           

[facility]



     (ATARAX) 25      3-14                               by mouth           [facility] 

of



     mg tablet      00:24:                               at             [place]



               49                                 bedtime.           Medical



                                                                 Branch

 

     propranolol      2020-0      Yes            10mg      Take 10 mg           

[facility]



     (INDERAL)      3-14                               by mouth 2           [facility] 

of



     10 mg      00:24:                               (two)           [place]



     tablet      49                                 times           Medical



                                                  daily.           Branch

 

     NIFEdipine      2020-0      Yes       [id number] 30mg      Take 30 mg        

   [facility]



     XL        3-14                               by mouth           [facility] of



     (NIFEDICAL      00:24:                               daily.           [place]



     XL) 30 mg      49                                                Medical



     24 hr                                                        [facility]



     tablet                                                        

 

     citalopram      2020-0      Yes            20mg      Take 20 mg           [facility]

nivers



     20 mg      3-14                               by mouth           [facility] of



     tablet      00:24:                               daily.           [place]



               49                                                Medical



                                                                 Branch

 

     proMETHazin      2020-0      Yes       [id number] 25mg      Take 1          

 [facility]



     e 25 mg      3-13                               tablet by           [facility] of



     tablet      00:00:                               mouth           [place]



               00                                 every 4           Medical



                                                  (four)           [facility]



                                                  hours as           



                                                  needed for           



                                                  Nausea and           



                                                  Vomiting           



                                                  (N/V).           

 

     proMETHazin      2020-0      Yes       [id number] 25mg      Take 1          

 [facility]



     e 25 mg      3-13                               tablet by           [facility] of



     tablet      00:00:                               mouth           [place]



               00                                 every 4           Medical



                                                  (four)           [facility]



                                                  hours as           



                                                  needed for           



                                                  Nausea and           



                                                  Vomiting           



                                                  (N/V).           

 

     proMETHazin      2020-0 2020- No        [id number] 25mg      Take 1         

  [facility]



     e 25 mg      3-13 08-05                          tablet by           [facility] of



     tablet      00:00: 00:00                          mouth           [place]



               00   :00                           every 4           Medical



                                                  (four)           [facility]



                                                  hours as           



                                                  needed for           



                                                  Nausea and           



                                                  Vomiting           



                                                  (N/V).           

 

     atomoxetine      2020-0      Yes                      atomoxetin           

[facility]



     18 mg      3-12                               e 18 mg           [facility] of



     capsule      00:00:                               capsule           [place]



               00                                                Medical



                                                                 [facility]

 

     atomoxetine      2020-0      Yes                      atomoxetin           

[facility]



     18 mg      3-12                               e 18 mg           [facility] of



     capsule      00:00:                               capsule           [place]



               00                                                Medical



                                                                 [facility]

 

     atomoxetine      2020-0      Yes                      atomoxetin           

[facility]



     18 mg      3-12                               e 18 mg           ity of



     capsule      00:00:                               capsule           [place]



                                                               Medical



                                                                 [facility]

 

     atomoxetine      2020-0      Yes                      atomoxetin           

[facility]



     18 mg      3-12                               e 18 mg           ity of



     capsule      00:00:                               capsule           [place]



                                                               Medical



                                                                 [facility]

 

     atomoxetine      2020-0      Yes                      atomoxetin           

[facility]



     18 mg      3-12                               e 18 mg           ity of



     capsule      00:00:                               capsule           [place]



                                                               Medical



                                                                 [facility]

 

     atomoxetine      2020-0      Yes                      atomoxetin           

[facility]



     18 mg      3-12                               e 18 mg           [facility] of



     capsule      00:00:                               capsule           [place]



                                                               Medical



                                                                 [facility]

 

     atomoxetine      2020-0      Yes                      atomoxetin           

[facility]



     18 mg      3-12                               e 18 mg           ity of



     capsule      00:00:                               capsule           [place]



                                                               Medical



                                                                 [facility]

 

     atomoxetine      2020-0      Yes                      atomoxetin           

[facility]



     18 mg      3-12                               e 18 mg           ity of



     capsule      00:00:                               capsule           [place]



                                                               Medical



                                                                 [facility]

 

     atomoxetine      2020-0      Yes                      atomoxetin           

[facility]



     18 mg      3-12                               e 18 mg           ity of



     capsule      00:00:                               capsule           [place]



                                                               Medical



                                                                 [facility]

 

     atomoxetine      2020-0      Yes                      atomoxetin           

[facility]



     18 mg      3-12                               e 18 mg           ity of



     capsule      00:00:                               capsule           [place]



                                                               Medical



                                                                 [facility]

 

     atomoxetine      2020-0      Yes                      atomoxetin           

[facility]



     18 mg      3-12                               e 18 mg           ity of



     capsule      00:00:                               capsule           [place]



                                                               [facility]



                                                                 [facility]

 

     atomoxetine      2020-0      Yes                      atomoxetin           

[facility]



     18 mg      3-12                               e 18 mg           ity of



     capsule      00:00:                               capsule           [place]



                                                               [facility]



                                                                 [facility]

 

     atomoxetine      2020-0      Yes                      atomoxetin           

[facility]



     18 mg      3-12                               e 18 mg           ity of



     capsule      00:00:                               capsule           [place]



                                                               [facility]



                                                                 [facility]

 

     atomoxetine      2020-0      Yes                      atomoxetin           

[facility]



     18 mg      3-12                               e 18 mg           ity of



     capsule      00:00:                               capsule           [place]



                                                               [facility]



                                                                 [facility]

 

     atomoxetine      2020-0      Yes                      atomoxetin           

[facility]



     18 mg      3-12                               e 18 mg           ity of



     capsule      00:00:                               capsule           [place]



                                                               [facility]



                                                                 [facility]

 

     atomoxetine      2020-0      Yes                      atomoxetin           

[facility]



     18 mg      3-12                               e 18 mg           ity of



     capsule      00:00:                               capsule           [place]



                                                               [facility]



                                                                 [facility]

 

     atomoxetine      2020-0      Yes                      atomoxetin           

[facility]



     18 mg      3-12                               e 18 mg           ity of



     capsule      00:00:                               capsule           [place]



                                                               [facility]



                                                                 [facility]

 

     atomoxetine      2020-0      Yes                      atomoxetin           

[facility]



     18 mg      3-12                               e 18 mg           ity of



     capsule      00:00:                               capsule           [place]



               00                                                Medical



                                                                 [facility]

 

     atomoxetine      2020-0      Yes                      atomoxetin           

[facility]



     18 mg      3-12                               e 18 mg           ity of



     capsule      00:00:                               capsule           [place]



                                                               Medical



                                                                 [facility]

 

     atomoxetine      2020-0      Yes                      atomoxetin           

[facility]



     18 mg      3-12                               e 18 mg           ity of



     capsule      00:00:                               capsule           [place]



                                                               [facility]



                                                                 [facility]

 

     atomoxetine      2020-0      Yes                      atomoxetin           

[facility]



     18 mg      3-12                               e 18 mg           ity of



     capsule      00:00:                               capsule           [place]



                                                               Medical



                                                                 [facility]

 

     atomoxetine      2020-0      Yes                      atomoxetin           

[facility]



     18 mg      3-12                               e 18 mg           ity of



     capsule      00:00:                               capsule           [place]



                                                               [facility]



                                                                 [facility]

 

     atomoxetine      2020-0      Yes                      atomoxetin           

[facility]



     18 mg      3-12                               e 18 mg           ity of



     capsule      00:00:                               capsule           [place]



                                                               Medical



                                                                 [facility]

 

     atomoxetine      2020-0      Yes                      atomoxetin           

[facility]



     18 mg      3-12                               e 18 mg           ity of



     capsule      00:00:                               capsule           [place]



                                                               [facility]



                                                                 [facility]

 

     atomoxetine      -0      Yes                      atomoxetin           

[facility]



     18 mg      3-12                               e 18 mg           ity of



     capsule      00:00:                               capsule           [place]



                                                               Medical



                                                                 [facility]

 

     atomoxetine      -0      Yes                      atomoxetin           

[facility]



     18 mg      3-12                               e 18 mg           ity of



     capsule      00:00:                               capsule           [place]



                                                               Medical



                                                                 [facility]

 

     atomoxetine      2020-0      Yes                      atomoxetin           

[facility]



     18 mg      3-12                               e 18 mg           ity of



     capsule      00:00:                               capsule           [address]



                                                                 Branch

 

     citalopram            Yes            20mg      Take 20 mg           U

nivers



     20 mg      2-18                               by mouth           ity of



     tablet      22:24:                               daily.           [address]

 

     citalopram            Yes            20mg      Take 20 mg           U

nivers



     20 mg      2-18                               by mouth           ity of



     tablet      22:24:                               daily.           [address]

 

     citalopram            Yes            20mg      Take 20 mg           [facility]

[facility]



     20 mg      2-18                               by mouth           [facility]



     tablet      22:24:                               daily.           [address]

 

     citalopram            Yes            20mg      Take 20 mg           [facility]

[facility]



     20 mg      2-18                               by mouth           [facility]



     tablet      22:24:                               daily.           [address]

 

     benzonatate      -      Yes       [id number] 100mg      Take 1           

[facility]



     100 mg      2-18                               capsule by           [facility]



     capsule      00:00:                               mouth 3           [place]



               00                                 (three)           [facility]



                                                  times           [facility]



                                                  daily as           



                                                  needed for           



                                                  Cough.           

 

     ondansetron            Yes       [id number] 4mg       Take 1           [facility]

[facility]



     (ZOFRAN      2-18                               tablet by           [facility]



     ODT) 4 mg      00:00:                               mouth           [place]



     disintegrat      00                                 every 8           Med[facility]

al



     ing tablet                                         (eight)           [facility]



                                                  hours as           



                                                  needed for           



                                                  Nausea and           



                                                  Vomiting           



                                                  (N/V).           

 

     ibuprofen      2019-0      Yes       [id number] 600mg      Take 1           [facility]

[facility]



     600 mg      2-18                               tablet by           [facility]



     tablet      00:00:                               mouth           [place]



               00                                 every 6           [facility]



                                                  (six)           [facility]



                                                  hours as           



                                                  needed for           



                                                  Pain           



                                                  (scale           



                                                  4-6).           

 

     benzonatate      2019-0      Yes       [id number] 100mg      Take 1           

[facility]



     100 mg      2-18                               capsule by           [facility]



     capsule      00:00:                               mouth 3           [place]



               00                                 (three)           [facility]



                                                  times           [facility]



                                                  daily as           



                                                  needed for           



                                                  Cough.           

 

     ondansetron      2019-0      Yes       [id number] 4mg       Take 1           [facility]

[facility]



     (ZOFRAN      2-18                               tablet by           [facility]



     ODT) 4 mg      00:00:                               mouth           [place]



     disintegrat      00                                 every 8           [facility]

al



     ing tablet                                         (eight)           [facility]



                                                  hours as           



                                                  needed for           



                                                  Nausea and           



                                                  Vomiting           



                                                  (N/V).           

 

     ibuprofen      2019-0      Yes       [id number] 600mg      Take 1           [facility]

[facility]



     600 mg      2-18                               tablet by           [facility]



     tablet      00:00:                               mouth           [place]



               00                                 every 6           [facility]



                                                  (six)           [facility]



                                                  hours as           



                                                  needed for           



                                                  Pain           



                                                  (scale           



                                                  4-6).           

 

     benzonatate      2019-0      Yes       [id number] 100mg      Take 1           

[facility]



     100 mg      2-18                               capsule by           [facility]



     capsule      00:00:                               mouth 3           [place]



               00                                 (three)           [facility]



                                                  times           [facility]



                                                  daily as           



                                                  needed for           



                                                  Cough.           

 

     ondansetron      2019-0      Yes       [id number] 4mg       Take 1           [facility]

[facility]



     (ZOFRAN      2-18                               tablet by           [facility]



     ODT) 4 mg      00:00:                               mouth           [place]



     disintegrat      00                                 every 8           [facility]

al



     ing tablet                                         (eight)           [facility]



                                                  hours as           



                                                  needed for           



                                                  Nausea and           



                                                  Vomiting           



                                                  (N/V).           

 

     ibuprofen      2019-0      Yes       [id number] 600mg      Take 1           [facility]

[facility]



     600 mg      2-18                               tablet by           [facility]



     tablet      00:00:                               mouth           [place]



               00                                 every 6           [facility]



                                                  (six)           [facility]



                                                  hours as           



                                                  needed for           



                                                  Pain           



                                                  (scale           



                                                  4-6).           

 

     benzonatate      2019-0      Yes       [id number] 100mg      Take 1           

[facility]



     100 mg      2-18                               capsule by           [facility]



     capsule      00:00:                               mouth 3           [place]



               00                                 (three)           [facility]



                                                  times           [facility]



                                                  daily as           



                                                  needed for           



                                                  Cough.           

 

     ondansetron      2019-0      Yes       [id number] 4mg       Take 1           [facility]

[facility]



     (ZOFRAN      2-18                               tablet by           [facility]



     ODT) 4 mg      00:00:                               mouth           [place]



     disintegrat      00                                 every 8           [facility]

al



     ing tablet                                         (eight)           [facility]



                                                  hours as           



                                                  needed for           



                                                  Nausea and           



                                                  Vomiting           



                                                  (N/V).           

 

     ibuprofen      2019-0      Yes       [id number] 600mg      Take 1           [facility]

[facility]



     600 mg      2-18                               tablet by           [facility]



     tablet      00:00:                               mouth           [place]



               00                                 every 6           [facility]



                                                  (six)           [facility]



                                                  hours as           



                                                  needed for           



                                                  Pain           



                                                  (scale           



                                                  4-6).           

 

     benzonatate      2019-0      Yes       [id number] 100mg      Take 1           

[facility]



     100 mg      2-18                               capsule by           [facility]



     capsule      00:00:                               mouth 3           [place]



               00                                 (three)           [facility]



                                                  times           [facility]



                                                  daily as           



                                                  needed for           



                                                  Cough.           

 

     ondansetron      -0      Yes       [id number] 4mg       Take 1           [facility]

[facility]



     (ZOFRAN      2-18                               tablet by           [facility]



     ODT) 4 mg      00:00:                               mouth           [place]



     disintegrat      00                                 every 8           Med[facility]

al



     ing tablet                                         (eight)           [facility]



                                                  hours as           



                                                  needed for           



                                                  Nausea and           



                                                  Vomiting           



                                                  (N/V).           

 

     ibuprofen            Yes       [id number] 600mg      Take 1           [facility]

[facility]



     600 mg      2-18                               tablet by           [facility]



     tablet      00:00:                               mouth           [place]



               00                                 every 6           [facility]



                                                  (six)           [facility]



                                                  hours as           



                                                  needed for           



                                                  Pain           



                                                  (scale           



                                                  4-6).           

 

     benzonatate      -0      Yes       [id number] 100mg      Take 1           

[facility]



     100 mg      2-18                               capsule by           [facility]



     capsule      00:00:                               mouth 3           [place]



               00                                 (three)           [facility]



                                                  times           [facility]



                                                  daily as           



                                                  needed for           



                                                  Cough.           

 

     ondansetron      0      Yes       [id number] 4mg       Take 1           [facility]

[facility]



     (ZOFRAN      2-18                               tablet by           [facility]



     ODT) 4 mg      00:00:                               mouth           [place]



     disintegrat      00                                 every 8           Med[facility]

al



     ing tablet                                         (eight)           [facility]



                                                  hours as           



                                                  needed for           



                                                  Nausea and           



                                                  Vomiting           



                                                  (N/V).           

 

     ibuprofen      0      Yes       [id number] 600mg      Take 1           [facility]

[facility]



     600 mg      2-18                               tablet by           [facility]



     tablet      00:00:                               mouth           [place]



               00                                 every 6           [facility]



                                                  (six)           [facility]



                                                  hours as           



                                                  needed for           



                                                  Pain           



                                                  (scale           



                                                  4-6).           

 

     benzonatate       2020- No        [id number] 100mg      Take 1          

 [facility]



     100 mg      2-18 08-05                          capsule by           [facility]



     capsule      00:00: 00:00                          mouth 3           [place]



               00   :00                           (three)           [facility]



                                                  times           [facility]



                                                  daily as           



                                                  needed for           



                                                  Cough.           

 

     ondansetron      -0 2020- No        [id number] 4mg       Take 1           

[facility]



     (ZOFRAN      2-18 -05                          tablet by           [facility]



     ODT) 4 mg      00:00: 00:00                          mouth           [place]



     disintegrat      00   :00                           every 8           Medic

al



     ing tablet                                         (eight)           [facility]



                                                  hours as           



                                                  needed for           



                                                  Nausea and           



                                                  Vomiting           



                                                  (N/V).           

 

     ibuprofen       2020- No        [id number] 600mg      Take 1           [facility]

[facility]



     600 mg      2-18 -05                          tablet by           [facility]



     tablet      00:00: 00:00                          mouth           [place]



               00   :00                           every 6           [facility]



                                                  (six)           [facility]



                                                  hours as           



                                                  needed for           



                                                  Pain           



                                                  (scale           



                                                  4-6).           

 

     propranolol            Yes            10mg      Take 10 mg           

[facility]



     (INDERAL)      7-11                               by mouth 2           [facility] 

of



     10 mg      18:23:                               (two)           [place]



     tablet      37                                 times           [facility]



                                                  daily.           [facility]

 

     NIFEdipine            Yes       [id number] 30mg      Take 30 mg        

   [facility]



     XL        7-11                               by mouth           [facility]



     (NIFEDICAL      18:23:                               daily.           [place]



     XL) 30 mg      37                                                Medical



     24 hr                                                        [facility]



     tablet                                                        

 

     propranolol            Yes            10mg      Take 10 mg           

[facility]



     (INDERAL)      7-11                               by mouth 2           [facility] 

of



     10 mg      18:23:                               (two)           [place]



     tablet      37                                 times           Medical



                                                  daily.           [facility]

 

     NIFEdipine            Yes       [id number] 30mg      Take 30 mg        

   [facility]



     XL        7-11                               by mouth           [facility]



     (NIFEDICAL      18:23:                               daily.           [place]



     XL) 30 mg      37                                                Medical



     24 hr                                                        [facility]



     tablet                                                        

 

     propranolol            Yes            10mg      Take 10 mg           

[facility]



     (INDERAL)      7-11                               by mouth 2           [facility] 

of



     10 mg      18:23:                               (two)           [place]



     tablet      37                                 times           Medical



                                                  daily.           [facility]

 

     NIFEdipine            Yes       [id number] 30mg      Take 30 mg        

   [facility]



     XL        7-11                               by mouth           [facility]



     (NIFEDICAL      18:23:                               daily.           [place]



     XL) 30 mg      37                                                Medical



     24 hr                                                        [facility]



     tablet                                                        

 

     propranolol            Yes            10mg      Take 10 mg           

[facility]



     (INDERAL)      7-11                               by mouth 2           [facility] 

of



     10 mg      18:23:                               (two)           [place]



     tablet      37                                 times           Medical



                                                  daily.           [facility]

 

     NIFEdipine            Yes       [id number] 30mg      Take 30 mg        

   [facility]



     XL        7-11                               by mouth           [facility] of



     (NIFEDICAL      18:23:                               daily.           [place]



     XL) 30 mg      37                                                Medical



     24 hr                                                        [facility]



     tablet                                                        

 

     hydrOXYzine            Yes            25mg      Take 25 mg           

[facility]



     (ATARAX) 25      5-09                               by mouth           [facility] 

of



     mg tablet      19:34:                               at             [place]



               25                                 bedtime.           Medical



                                                                 [facility]

 

     hydrOXYzine            Yes            25mg      Take 25 mg           

[facility]



     (ATARAX) 25      5-09                               by mouth           [facility] 

of



     mg tablet      19:34:                               at             [place]



               25                                 bedtime.           [facility]

 

     hydrOXYzine      2017-0      Yes            25mg      Take 25 mg           

[facility]



     (ATARAX) 25      5-09                               by mouth           ity 

of



     mg tablet      19:34:                               at             [place]



               25                                 bedtime.           [facility]

 

     hydrOXYzine      2017-0      Yes            25mg      Take 25 mg           

[facility]



     (ATARAX) 25      5-09                               by mouth           ity 

of



     mg tablet      19:34:                               at             [place]



               25                                 bedtime.           [facility]

 

     FLUoxetine      2017-0      Yes                      daily.           [facility]

rs



     40 mg      5-04                                              ity of



     capsule      00:00:                                              [place]



               00                                                [facility]

 

     FLUoxetine      2017-0      Yes                      daily.           [facility]

rs



     40 mg      5-04                                              ity of



     capsule      00:00:                                              [place]



               00                                                [facility]

 

     FLUoxetine      2017-0      Yes                      daily.           [facility]

rs



     40 mg      5-04                                              ity of



     capsule      00:00:                                              [place]



               00                                                [facility]

 

     FLUoxetine      2017-0      Yes                      daily.           [facility]

rs



     40 mg      5-04                                              ity of



     capsule      00:00:                                              [place]



               00                                                [facility]

 

     FLUoxetine      2017-0      Yes                      daily.           [facility]

rs



     40 mg      5-04                                              ity of



     capsule      00:00:                                              [place]



               00                                                [facility]

 

     FLUoxetine      2017-0      Yes                      daily.           [facility]

rs



     40 mg      5-04                                              ity of



     capsule      00:00:                                              [place]



               00                                                [facility]

 

     FLUoxetine      2017-0      Yes                      daily.           [facility]

rs



     40 mg      5-04                                              ity of



     capsule      00:00:                                              [place]



                                                               [facility]

 

     FLUoxetine      2017-0      Yes                      daily.           [facility]

rs



     40 mg      5-04                                              ity of



     capsule      00:00:                                              [place]



               00                                                [facility]

 

     FLUoxetine      2017-0      Yes                      daily.           [facility]

rs



     40 mg      5-04                                              ity of



     capsule      00:00:                                              [place]



               00                                                [facility]

 

     FLUoxetine      2017-0      Yes                      daily.           [facility]

rs



     40 mg      5-04                                              ity of



     capsule      00:00:                                              [place]



               00                                                [facility] [facility]

 

     FLUoxetine      2017-0      Yes                      daily.           [facility]

rs



     40 mg      5-04                                              ity of



     capsule      00:00:                                              [place]



               00                                                [facility]

 

     FLUoxetine      2017-0      Yes                      daily.           [facility]

rs



     40 mg      5-04                                              ity of



     capsule      00:00:                                              [place]



               00                                                [facility]

 

     FLUoxetine      2017-0      Yes                      daily.           [facility]

rs



     40 mg      5-04                                              ity of



     capsule      00:00:                                              [place]



               00                                                [facility]

 

     FLUoxetine      2017-0      Yes                      daily.           [facility]

rs



     40 mg      5-04                                              ity of



     capsule      00:00:                                              [place]



               00                                                [facility]



                                                                 Branch

 

     FLUoxetine      2017-0      Yes                      daily.           [facility]

rs



     40 mg      5-04                                              ity of



     capsule      00:00:                                              [place]



               00                                                [facility]



                                                                 Branch

 

     FLUoxetine      2017-0      Yes                      daily.           [facility]

rs



     40 mg      5-04                                              ity of



     capsule      00:00:                                              [place]



                                                               [facility]



                                                                 Branch

 

     FLUoxetine      2017-0      Yes                      daily.           [facility]

rs



     40 mg      5-04                                              ity of



     capsule      00:00:                                              [place]



                                                               [facility]



                                                                 Branch

 

     FLUoxetine      2017-0      Yes                      daily.           [facility]

rs



     40 mg      5-04                                              ity of



     capsule      00:00:                                              [place]



                                                               [facility]



                                                                 Branch

 

     FLUoxetine      2017-0      Yes                      daily.           [facility]

rs



     40 mg      5-04                                              ity of



     capsule      00:00:                                              [place]



                                                               [facility]



                                                                 Branch

 

     FLUoxetine      2017-0      Yes                      daily.           [facility]

rs



     40 mg      5-04                                              ity of



     capsule      00:00:                                              [place]



                                                               [facility]



                                                                 Branch

 

     FLUoxetine      2017-0      Yes                      daily.           [facility]

rs



     40 mg      5-04                                              ity of



     capsule      00:00:                                              [place]



                                                               [facility]



                                                                 Branch

 

     FLUoxetine      2017-0      Yes                      daily.           [facility]

rs



     40 mg      5-04                                              ity of



     capsule      00:00:                                              [place]



                                                               [facility]



                                                                 Branch

 

     FLUoxetine      2017-0      Yes                      daily.           [facility]

rs



     40 mg      5-04                                              ity of



     capsule      00:00:                                              [place]



                                                               [facility]



                                                                 Branch

 

     FLUoxetine      2017-0      Yes                      daily.           [facility]

rs



     40 mg      5-04                                              ity of



     capsule      00:00:                                              [place]



                                                               [facility]



                                                                 Branch

 

     FLUoxetine      2017-0      Yes                      daily.           [facility]

rs



     40 mg      5-04                                              ity of



     capsule      00:00:                                              [place]



                                                               [facility]



                                                                 Branch

 

     FLUoxetine      2017-0      Yes                      daily.           [facility]

rs



     40 mg      5-04                                              ity of



     capsule      00:00:                                              [place]



                                                               [facility]



                                                                 Branch

 

     FLUoxetine      2017-0      Yes                      daily.           [facility]

rs



     40 mg      5-04                                              ity of



     capsule      00:00:                                              [place]



                                                               [facility]



                                                                 Branch

 

     FLUoxetine      2017-0      Yes                      daily.           [facility]

rs



     40 mg      5-04                                              ity of



     capsule      00:00:                                              [place]



                                                               [facility]



                                                                 Branch

 

     FLUoxetine      2017-0      Yes                      daily.           [facility]

rs



     40 mg      5-04                                              ity of



     capsule      00:00:                                              [place]



                                                               [facility]



                                                                 Branch

 

     FLUoxetine      2017-0      Yes                      daily.           [facility]

rs



     40 mg      5-04                                              ity of



     capsule      00:00:                                              [place]



                                                               [facility]



                                                                 Branch

 

     FLUoxetine      2017-0      Yes                      daily.           [facility]

rs



     40 mg      5-04                                              ity of



     capsule      00:00:                                              [place]



                                                               [facility]



                                                                 Branch

 

     acetaminoph      2017-0      Yes            1{tbl}      Take 1           Un

[facility]



     en-codeine      4-26                               tablet by           ity 

of



     300-30 mg      00:00:                               mouth           [place]



     tablet      00                                 every 4           Medical



                                                  (four)           Branch



                                                  hours as           



                                                  needed for           



                                                  Pain           



                                                  (scale           



                                                  1-3) or           



                                                  Pain           



                                                  (scale           



                                                  7-10).           

 

     acetaminoph      2017-      Yes            1{tbl}      Take 1           Un

[facility]



     en-codeine      4-26                               tablet by           ity 

of



     300-30 mg      00:00:                               mouth           [place]



     tablet      00                                 every 4           Medical



                                                  (four)           Branch



                                                  hours as           



                                                  needed for           



                                                  Pain           



                                                  (scale           



                                                  1-3) or           



                                                  Pain           



                                                  (scale           



                                                  7-10).           

 

     acetaminoph      2017-0      Yes            1{tbl}      Take 1           Un

[facility]



     en-codeine      4-26                               tablet by           ity 

of



     300-30 mg      00:00:                               mouth           [place]



     tablet      00                                 every 4           Medical



                                                  (four)           Branch



                                                  hours as           



                                                  needed for           



                                                  Pain           



                                                  (scale           



                                                  1-3) or           



                                                  Pain           



                                                  (scale           



                                                  7-10).           

 

     acetaminoph            Yes            1{tbl}      Take 1           Un

[facility]



     en-codeine      4-26                               tablet by           ity 

of



     300-30 mg      00:00:                               mouth           [place]



     tablet      00                                 every 4           Medical



                                                  (four)           Branch



                                                  hours as           



                                                  needed for           



                                                  Pain           



                                                  (scale           



                                                  1-3) or           



                                                  Pain           



                                                  (scale           



                                                  7-10).           

 

     acetaminoph            Yes            1{tbl}      Take 1           Un

[facility]



     en-codeine      4-26                               tablet by           ity 

of



     300-30 mg      00:00:                               mouth           [place]



     tablet      00                                 every 4           Medical



                                                  (four)           Branch



                                                  hours as           



                                                  needed for           



                                                  Pain           



                                                  (scale           



                                                  1-3) or           



                                                  Pain           



                                                  (scale           



                                                  7-10).           

 

     acetaminoph            Yes            1{tbl}      Take 1           Un

[facility]



     en-codeine      4-26                               tablet by           ity 

of



     300-30 mg      00:00:                               mouth           [place]



     tablet      00                                 every 4           Medical



                                                  (four)           Branch



                                                  hours as           



                                                  needed for           



                                                  Pain           



                                                  (scale           



                                                  1-3) or           



                                                  Pain           



                                                  (scale           



                                                  7-10).           

 

     acetaminoph      2017 2020- No             1{tbl}      Take 1           U

[facility]



     en-codeine      4-26 08-05                          tablet by           ity

 of



     300-30 mg      00:00: 00:00                          mouth           [place]



     tablet      00   :00                           every 4           Medical



                                                  (four)           Branch



                                                  hours as           



                                                  needed for           



                                                  Pain           



                                                  (scale           



                                                  1-3) or           



                                                  Pain           



                                                  (scale           



                                                  7-10).           

 

     polyethylen      2017-      Yes                      as needed.           

[facility]



     e glycol 17      3-07                                              ity of



     gram/dose      00:00:                                              [place]



     powder      00                                                Medical



                                                                 Branch

 

     polyethylen      2017-0      Yes                      as needed.           

[facility]



     e glycol 17      3-07                                              ity of



     gram/dose      00:00:                                              [place]



     powder      00                                                Medical



                                                                 Branch

 

     polyethylen      2017-0      Yes                      as needed.           

[facility]



     e glycol 17      3-07                                              ity of



     gram/dose      00:00:                                              [place]



     powder      00                                                Medical



                                                                 Branch

 

     polyethylen      2017-0      Yes                      as needed.           

[facility]



     e glycol 17      3-07                                              ity of



     gram/dose      00:00:                                              [place]



     powder      00                                                Medical



                                                                 Branch

 

     polyethylen      2017-0      Yes                      as needed.           

[facility]



     e glycol 17      3-07                                              ity of



     gram/dose      00:00:                                              [place]



     powder      00                                                Medical



                                                                 Branch

 

     polyethylen      2017-0      Yes                      as needed.           

[facility]



     e glycol 17      3-07                                              ity of



     gram/dose      00:00:                                              [place]



     powder      00                                                Medical



                                                                 Branch

 

     polyethylen      2017-0      Yes                      as needed.           

[facility]



     e glycol 17      3-07                                              ity of



     gram/dose      00:00:                                              [place]



     powder      00                                                Medical



                                                                 Branch

 

     polyethylen      2017-0      Yes                      as needed.           

[facility]



     e glycol 17      3-07                                              ity of



     gram/dose      00:00:                                              [place]



     powder      00                                                Medical



                                                                 Branch

 

     polyethylen      2017-0      Yes                      as needed.           

[facility]



     e glycol 17      3-07                                              ity of



     gram/dose      00:00:                                              [place]



     powder      00                                                Medical



                                                                 Branch

 

     polyethylen      2017-0      Yes                      as needed.           

[facility]



     e glycol 17      3-07                                              ity of



     gram/dose      00:00:                                              [place]



     powder      00                                                Medical



                                                                 Branch

 

     polyethylen      2017-0      Yes                      as needed.           

[facility]



     e glycol 17      3-07                                              ity of



     gram/dose      00:00:                                              [place]



     powder      00                                                Medical



                                                                 Branch

 

     polyethylen      2017-0      Yes                      as needed.           

[facility]



     e glycol 17      3-07                                              ity of



     gram/dose      00:00:                                              [place]



     powder      00                                                Medical



                                                                 Branch

 

     polyethylen      2017-0      Yes                      as needed.           

[facility]



     e glycol 17      3-07                                              ity of



     gram/dose      00:00:                                              [place]



     powder      00                                                Medical



                                                                 Branch

 

     polyethylen      2017-0      Yes                      as needed.           

[facility]



     e glycol 17      3-07                                              ity of



     gram/dose      00:00:                                              [place]



     powder      00                                                Medical



                                                                 Branch

 

     polyethylen      2017-0      Yes                      as needed.           

[facility]



     e glycol 17      3-07                                              ity of



     gram/dose      00:00:                                              [place]



     powder      00                                                Medical



                                                                 Branch

 

     polyethylen      2017-0      Yes                      as needed.           

[facility]



     e glycol 17      3-07                                              ity of



     gram/dose      00:00:                                              [place]



     powder      00                                                Medical



                                                                 Branch

 

     polyethylen      2017-0      Yes                      as needed.           

[facility]



     e glycol 17      3-07                                              ity of



     gram/dose      00:00:                                              [place]



     powder      00                                                Medical



                                                                 Branch

 

     polyethylen      2017-0      Yes                      as needed.           

[facility]



     e glycol 17      3-07                                              ity of



     gram/dose      00:00:                                              [place]



     powder      00                                                Medical



                                                                 Branch

 

     polyethylen      2017-0      Yes                      as needed.           

[facility]



     e glycol 17      3-07                                              ity of



     gram/dose      00:00:                                              [place]



     powder      00                                                Medical



                                                                 Branch

 

     polyethylen      2017-0      Yes                      as needed.           

[facility]



     e glycol 17      3-07                                              ity of



     gram/dose      00:00:                                              [place]



     powder      00                                                Medical



                                                                 Branch

 

     polyethylen      20170      Yes                      as needed.           

[facility]



     e glycol 17      3-07                                              ity of



     gram/dose      00:00:                                              [place]



     powder      00                                                Medical



                                                                 Branch

 

     polyethylen      2017-0      Yes                      as needed.           

[facility]



     e glycol 17      3-07                                              ity of



     gram/dose      00:00:                                              [place]



     powder      00                                                Medical



                                                                 Branch

 

     polyethylen      2017-0      Yes                      as needed.           

[facility]



     e glycol 17      3-07                                              ity of



     gram/dose      00:00:                                              [place]



     powder      00                                                Medical



                                                                 Branch

 

     polyethylen      20170      Yes                      as needed.           

[facility]



     e glycol 17      3-07                                              ity of



     gram/dose      00:00:                                              [place]



     [facility]      00                                                [facility]



                                                                 Branch

 

     polyethylen      2017      Yes                      as needed.           

[facility]



     e glycol 17      3-07                                              ity of



     gram/dose      00:00:                                              [place]



     powder      00                                                [facility]



                                                                 Branch

 

     polyethylen      2017      Yes                      as needed.           

[facility]



     e glycol 17      3-07                                              ity of



     gram/dose      00:00:                                              [place]



     powder      00                                                [facility]



                                                                 Branch

 

     polyethylen      20170      Yes                      as needed.           

[facility]



     e glycol 17      3-07                                              ity of



     gram/dose      00:00:                                              [place]



     powder      00                                                [facility]



                                                                 Branch

 

     polyethylen      20170      Yes                      as needed.           

[facility]



     e glycol 17      3-07                                              ity of



     gram/dose      00:00:                                              [place]



     powder      00                                                [facility]



                                                                 Branch

 

     polyethylen      2017      Yes                      as needed.           

[facility]



     e glycol 17      3-07                                              ity of



     gram/dose      00:00:                                              [place]



     powder      00                                                [facility]



                                                                 Branch

 

     polyethylen      2017      Yes                      as needed.           

[facility]



     e glycol 17      3-07                                              ity of



     gram/dose      00:00:                                              [place]



     powder      00                                                [facility]



                                                                 Branch

 

     polyethylen      20170      Yes                      as needed.           

[facility]



     e glycol 17      3-07                                              ity of



     gram/dose      00:00:                                              [place]



     powder      00                                                Medical



                                                                 Branch

 

     cetirizine      2016      Yes                      TK 1 T PO           Un

[facility]



     10 mg      2-19                               QHS            ity of



     tablet      00:00:                                              [place]



               00                                                [facility]

 

     cetirizine      2016      Yes                      TK 1 T PO           Un

[facility]



     10 mg      2-19                               QHS            ity of



     tablet      00:00:                                              [place]



               00                                                [facility]



                                                                 Branch

 

     cetirizine      2016      Yes                      TK 1 T PO           Un

[facility]



     10 mg      2-19                               QHS            ity of



     tablet      00:00:                                              [place]



                                                               [facility]



                                                                 Branch

 

     cetirizine      2016      Yes                      TK 1 T PO           Un

[facility]



     10 mg      2-19                               QHS            ity of



     tablet      00:00:                                              [place]



                                                               [facility]



                                                                 Branch

 

     cetirizine      2016      Yes                      TK 1 T PO           Un

[facility]



     10 mg      2-19                               QHS            ity of



     tablet      00:00:                                              [place]



                                                               [facility]



                                                                 Branch

 

     cetirizine      2016      Yes                      TK 1 T PO           Un

[facility]



     10 mg      2-19                               QHS            ity of



     tablet      00:00:                                              [place]



                                                               [facility]

 

     cetirizine      2016      Yes                      TK 1 T PO           Un

[facility]



     10 mg      2-19                               QHS            ity of



     tablet      00:00:                                              [place]



                                                               [facility]

 

     cetirizine      2016      Yes                      TK 1 T PO           Un

[facility]



     10 mg      2-19                               QHS            ity of



     tablet      00:00:                                              [place]



                                                               [facility]

 

     cetirizine      2016      Yes                      TK 1 T PO           Un

[facility]



     10 mg      2-19                               QHS            ity of



     tablet      00:00:                                              [place]



                                                               [facility]

 

     cetirizine      2016      Yes                      TK 1 T PO           Un

[facility]



     10 mg      2-19                               QHS            ity of



     tablet      00:00:                                              [place]



                                                               [facility]

 

     cetirizine      2016      Yes                      TK 1 T PO           Un

[facility]



     10 mg      2-19                               QHS            ity of



     tablet      00:00:                                              [place]



                                                               [facility]

 

     cetirizine      2016      Yes                      TK 1 T PO           Un

[facility]



     10 mg      2-19                               QHS            ity of



     tablet      00:00:                                              [place]



                                                               [facility]



                                                                 Branch

 

     cetirizine      2016      Yes                      TK 1 T PO           Un

[facility]



     10 mg      2-19                               QHS            ity of



     tablet      00:00:                                              [place]



                                                               [facility]



                                                                 Branch

 

     cetirizine      2016      Yes                      TK 1 T PO           Un

[facility]



     10 mg      2-19                               QHS            ity of



     tablet      00:00:                                              [place]



                                                               [facility]

 

     cetirizine      2016      Yes                      TK 1 T PO           Un

[facility]



     10 mg      2-19                               QHS            ity of



     tablet      00:00:                                              [place]



                                                               [facility]

 

     cetirizine      2016      Yes                      TK 1 T PO           Un

[facility]



     10 mg      2-19                               QHS            ity of



     tablet      00:00:                                              [place]



                                                               [facility]

 

     cetirizine      2016      Yes                      TK 1 T PO           Un

[facility]



     10 mg      2-19                               QHS            ity of



     tablet      00:00:                                              [place]



                                                               [facility]



                                                                 [facility]anch

 

     cetirizine      2016      Yes                      TK 1 T PO           Un

[facility]



     10 mg      2-19                               QHS            ity of



     tablet      00:00:                                              [place]



                                                               [facility]



                                                                 [facility]anch

 

     cetirizine      2016      Yes                      TK 1 T PO           Un

[facility]



     10 mg      2-19                               QHS            ity of



     tablet      00:00:                                              [place]



                                                               [facility]



                                                                 [facility]anch

 

     cetirizine      2016      Yes                      TK 1 T PO           Un

[facility]



     10 mg      2-19                               QHS            ity of



     tablet      00:00:                                              [place]



                                                               [facility]

 

     cetirizine      2016      Yes                      TK 1 T PO           Un

[facility]



     10 mg      2-19                               QHS            ity of



     tablet      00:00:                                              [place]



                                                               [facility]

 

     cetirizine      2016      Yes                      TK 1 T PO           Un

[facility]



     10 mg      2-19                               QHS            ity of



     tablet      00:00:                                              [place]



                                                               [facility]

 

     cetirizine      2016      Yes                      TK 1 T PO           Un

[facility]



     10 mg      2-19                               QHS            ity of



     tablet      00:00:                                              [place]



                                                               [facility]

 

     cetirizine      2016      Yes                      TK 1 T PO           Un

[facility]



     10 mg      2-19                               QHS            ity of



     tablet      00:00:                                              [place]



                                                               [facility]



                                                                 [facility]anch

 

     cetirizine      2016      Yes                      TK 1 T PO           Un

[facility]



     10 mg      2-19                               QHS            ity of



     tablet      00:00:                                              [place]



                                                               [facility]



                                                                 [facility]anch

 

     cetirizine      2016      Yes                      TK 1 T PO           Un

[facility]



     10 mg      2-19                               QHS            ity of



     tablet      00:00:                                              [place]



                                                               [facility]



                                                                 [facility]anch

 

     cetirizine      2016      Yes                      TK 1 T PO           Un

[facility]



     10 mg      2-19                               QHS            ity of



     tablet      00:00:                                              [place]



                                                               [facility]



                                                                 [facility]anch

 

     cetirizine      2016      Yes                      TK 1 T PO           Un

[facility]



     10 mg      2-19                               QHS            ity of



     tablet      00:00:                                              [place]



                                                               [facility]



                                                                 [facility]anch

 

     cetirizine      2016      Yes                      TK 1 T PO           Un

[facility]



     10 mg      2-19                               QHS            ity of



     tablet      00:00:                                              [place]



                                                               [facility]



                                                                 Branch

 

     cetirizine      2016      Yes                      TK 1 T PO           Un

[facility]



     10 mg      2-19                               QHS            ity of



     tablet      00:00:                                              [place]



                                                               [facility]

 

     cetirizine      2016      Yes                      TK 1 T PO           Un

[facility]



     10 mg      2-19                               QHS            ity of



     tablet      00:00:                                              [place]



                                                               [facility]







Vital Signs







             Vital Name   Observation Time Observation Value Comments     Source

 

             Systolic blood 2021 21:13:00 117 mm[Hg]                [facility]

[facility]



             pressure                                            [facility]



                                                                 [facility]

 

             Diastolic blood 2021 21:13:00 75 mm[Hg]                 [facility]

rsity of



             pressure                                            [facility]



                                                                 Branch

 

             Heart rate   2021 21:13:00 83 /min                   [facility]

ty of



                                                                 [facility]



                                                                 [facility]anch

 

             Body temperature 2021 21:08:00 36.33 Amna                 [facility]iv

ersity of



                                                                 [facility]



                                                                 [facility]anch

 

             Respiratory rate 2021 21:08:00 18 /min                   [facility]

ersity of



                                                                 [facility]



                                                                 [facility]anch

 

             Body height  2021 21:08:00 175.3 cm                  [facility]

ty of



                                                                 [facility]



                                                                 [facility]anch

 

             Body weight  2021 21:08:00 93.895 kg                 [facility]

ty of



                                                                 [facility]



                                                                 [facility]anch

 

             BMI          2021 21:08:00 30.57 kg/m2               [facility]

ty of



                                                                 [facility]



                                                                 [place]

 

             Oxygen saturation in 2021 21:08:00 98 /min                   

[facility]



             Arterial blood by                                        [facility]

maria a



             Pulse oximetry                                        Branch

 

             Systolic blood 2021 16:28:00 125 mm[Hg]                [facility]

sity of



             pressure                                            [facility]



                                                                 [facility]

 

             Diastolic blood 2021 16:28:00 85 mm[Hg]                 [facility]

rsity of



             pressure                                            [facility]



                                                                 [facility]anch

 

             Heart rate   2021 16:28:00 78 /min                   [facility]

ty of



                                                                 [facility]



                                                                 [facility]anch

 

             Body temperature 2021 16:28:00 36.56 Amna                 Univ

ersity of



                                                                 [facility]



                                                                 Branch

 

             Respiratory rate 2021 16:28:00 18 /min                   Univ

ersity of



                                                                 [facility]



                                                                 [facility]anch

 

             Body height  2021 16:28:00 175.3 cm                  [facility]

ty of



                                                                 [facility]



                                                                 [facility]anch

 

             Body weight  2021 16:28:00 94.167 kg                 [facility]

ty of



                                                                 [facility]



                                                                 [facility]anch

 

             BMI          2021 16:28:00 30.66 kg/m2               [facility]

ty of



                                                                 [facility]



                                                                 [facility]anch

 

             Systolic blood 2021 20:52:00 137 mm[Hg]                [facility]

sity of



             pressure                                            [facility]



                                                                 [facility]anch

 

             Diastolic blood 2021 20:52:00 86 mm[Hg]                 Unive

rsity of



             pressure                                            [facility]



                                                                 [facility]

 

             Heart rate   2021 20:52:00 74 /min                   [facility]

ty of



                                                                 [facility]



                                                                 [facility]

 

             Body temperature 2021 20:52:00 37 Amna                    Univ

ersity of



                                                                 [facility]



                                                                 [facility]

 

             Respiratory rate 2021 20:52:00 18 /min                   Univ

ersity of



                                                                 [facility]



                                                                 [facility]

 

             Body height  2021 20:52:00 175.3 cm                  [facility]

ty of



                                                                 [facility]



                                                                 [facility]

 

             Body weight  2021 20:52:00 95.074 kg                 [facility]

ty of



                                                                 [facility]



                                                                 [facility]

 

             BMI          2021 20:52:00 30.95 kg/m2               [facility]

ty of



                                                                 [facility]



                                                                 Branch

 

             Systolic blood 2020 23:11:00 140 mm[Hg]                [facility]

sity of



             pressure                                            [facility]



                                                                 [facility]anch

 

             Diastolic blood 2020 23:11:00 85 mm[Hg]                 Unive

rsity of



             pressure                                            [facility]



                                                                 [facility]anch

 

             Heart rate   2020 23:08:00 90 /min                   [facility]

ty of



                                                                 [facility]



                                                                 Branch

 

             Body temperature 2020 23:08:00 37.39 Amna                 Univ

ersity of



                                                                 [facility]



                                                                 Branch

 

             Respiratory rate 2020 23:08:00 18 /min                   Univ

ersity of



                                                                 [facility]



                                                                 [facility]

 

             Body height  2020 23:08:00 172.7 cm                  [facility]

ty of



                                                                 [facility]



                                                                 [facility]

 

             Body weight  2020 23:08:00 89.359 kg                 [facility]

ty of



                                                                 [facility]



                                                                 [facility]anch

 

             BMI          2020 23:08:00 29.95 kg/m2               [facility]

ty of



                                                                 [facility]



                                                                 [facility]anch

 

             Oxygen saturation in 2020 23:08:00 97 /min                   

[facility] 



             Arterial blood by                                        [facility]

[facility] a



             Pulse oximetry                                        Branch

 

             Systolic blood 2020 23:11:00 140 mm[Hg]                [facility]

sity of



             pressure                                            [facility]



                                                                 [facility]

 

             Diastolic blood 2020 23:11:00 85 mm[Hg]                 Unive

rsity of



             pressure                                            [facility]



                                                                 [facility]

 

             Heart rate   2020 23:08:00 90 /min                   [facility]

ty of



                                                                 [facility]



                                                                 [facility]

 

             Body temperature 2020 23:08:00 37.39 Amna                 Univ

ersity of



                                                                 [facility]



                                                                 Branch

 

             Respiratory rate 2020 23:08:00 18 /min                   Univ

ersity of



                                                                 [facility]



                                                                 Branch

 

             Body height  2020 23:08:00 172.7 cm                  Universi

ty of



                                                                 [facility]



                                                                 Branch

 

             Body weight  2020 23:08:00 89.359 kg                 Universi

ty of



                                                                 [facility]



                                                                 Branch

 

             BMI          2020 23:08:00 29.95 kg/m2               Universi

ty of



                                                                 [facility]



                                                                 Branch

 

             Oxygen saturation in 2020 23:08:00 97 /min                   

University of



             Arterial blood by                                        [facility]

maria a



             Pulse oximetry                                        Branch

 

             Systolic blood 2020 20:45:00 131 mm[Hg]                Univer

sity of



             pressure                                            [facility]



                                                                 Branch

 

             Diastolic blood 2020 20:45:00 91 mm[Hg]                 Unive

rsity of



             pressure                                            [facility]



                                                                 Branch

 

             Heart rate   2020 20:45:00 64 /min                   Universi

ty of



                                                                 [facility]



                                                                 Branch

 

             Body temperature 2020 20:45:00 37.22 Amna                 Univ

ersity of



                                                                 [facility]



                                                                 Branch

 

             Respiratory rate 2020 20:45:00 18 /min                   Univ

ersity of



                                                                 [facility]



                                                                 Branch

 

             Oxygen saturation in 2020 20:45:00 98 /min                   

University [facility]



             Arterial blood by                                        [facility]

maria a



             Pulse oximetry                                        Branch

 

             Systolic blood 2020 20:45:00 131 mm[Hg]                Univer

sity of



             pressure                                            [facility]



                                                                 Branch

 

             Diastolic blood 2020 20:45:00 91 mm[Hg]                 Unive

rsity of



             pressure                                            [facility]



                                                                 Branch

 

             Heart rate   2020 20:45:00 64 /min                   Universi

ty of



                                                                 [facility]



                                                                 Branch

 

             Body temperature 2020 20:45:00 37.22 Amna                 Univ

ersity of



                                                                 [facility]



                                                                 Branch

 

             Respiratory rate 2020 20:45:00 18 /min                   Univ

ersity of



                                                                 [facility]



                                                                 Branch

 

             Oxygen saturation in 2020 20:45:00 98 /min                   

[facility]



             Arterial blood by                                        [facility]

maria a



             Pulse oximetry                                        Branch

 

             Systolic blood 2020 00:26:00 129 mm[Hg]                Univer

sity of



             pressure                                            [facility]



                                                                 Branch

 

             Diastolic blood 2020 00:26:00 70 mm[Hg]                 Unive

rsity of



             pressure                                            [facility]



                                                                 Branch

 

             Heart rate   2020 00:26:00 103 /min                  Universi

ty of



                                                                 [facility]



                                                                 Branch

 

             Body temperature 2020 00:26:00 37.06 Amna                 Univ

ersity of



                                                                 [facility]



                                                                 Branch

 

             Respiratory rate 2020 00:26:00 16 /min                   Univ

ersity of



                                                                 [facility]



                                                                 Branch

 

             Body height  2020 00:26:00 175.3 cm                  Universi

ty of



                                                                 [facility]



                                                                 Branch

 

             Body weight  2020 00:26:00 89.359 kg                 Universi

ty of



                                                                 [facility]



                                                                 [place]

 

             BMI          2020 00:26:00 29.09 kg/m2               Universi

ty of



                                                                 [facility]



                                                                 [place]

 

             Oxygen saturation in 2020 00:26:00 99 /min                   

[facility]



             Arterial blood by                                        [facility]

maria a



             Pulse oximetry                                        Branch

 

             Systolic blood 2020 00:26:00 129 mm[Hg]                Univer

sity of



             pressure                                            [facility]

 

             Diastolic blood 2020 00:26:00 70 mm[Hg]                 Unive

rsity of



             pressure                                            [facility]

 

             Heart rate   2020 00:26:00 103 /min                  Universi

ty of



                                                                 [facility]

 

             Body temperature 2020 00:26:00 37.06 Amna                 [facility]iv

[facility]

 

             Respiratory rate 2020 00:26:00 16 /min                   Univ

[facility]

 

             Body height  2020 00:26:00 175.3 cm                  Universi

ty of



                                                                 [facility]

 

             Body weight  2020 00:26:00 89.359 kg                 Universi

ty of



                                                                 [facility]



                                                                 [place]

 

             BMI          2020 00:26:00 29.09 kg/m2               Universi

ty of



                                                                 [facility]



                                                                 [place]

 

             Oxygen saturation in 2020 00:26:00 99 /min                   

University of



             Arterial blood by                                        [facility]

maria a



             Pulse oximetry                                        Branch

 

             Body temperature 2019-09-10 19:20:00 36.94 Amna                 Univ

ersity [facility]

 

             Body weight  2019-09-10 19:20:00 87.5 kg                   Universi

ty of



                                                                 [facility]



                                                                 [place]

 

             Body temperature 2019-09-10 19:20:00 36.94 Amna                 Univ

ersity [facility]

 

             Body weight  2019-09-10 19:20:00 87.5 kg                   Universi

ty [facility]







Procedures







                Procedure       Date / Time     Performing Clinician Source



                                Performed                       

 

                ASSIGNMENT OF BENEFITS 2021 21:03:58 Doctor Unassigned, [facility] Name         Medical [place]

 

                EXTERNAL PROVIDER RECORDS 2021 05:01:00 Doctor Unassigned,

 [facility]



                                                [place]         Medical [facility]

 

                PHOSPHORUS      2021 21:55:00 Farhat Beck    [facility]

 

                MAGNESIUM       2021 [facility]55:00 [facility]

 

                BASIC METABOLIC PANEL (NA, 2021 21:55:00 Kochar, Farhat    [facility]



                K, CL, CO2, GLUCOSE, BUN,                                 Medica

l Branch



                CREATININE, CA)                                 

 

                CBC WITHOUT DIFF 2021 21:55:00 [facility]    [facility]

 

                URINALYSIS      2021 21:55:00 Farhat Beck    [place]

f [facility]

 

                PROTEIN CREAT RATIO URINE 2021 21:55:00 Farhat Beck    Un

[facility]

 

                EXTERNAL PROVIDER RECORDS 2021 06:01:00 Doctor Hallie,

 [facility]



                                                [place]         [facility]

 

                CT ABDOMEN PELVIS WO [facility]09:33 [facility]



                CONTRAST                                        [facility]

 

                US SCROTUM AND CONTENTS 2021 02:47:15 Be Boyd  [facility]

 

                US RETROPERITONEAL 2021 02:07:07 [facility]



                COMPLETE                                        [facility]

 

                POCT URINALYSIS AUTO 2021 20:54:00 Be Boyd  [facility]

 

                ASSIGNMENT OF BENEFITS 2021 20:18:18 Doctor Unazamzam, Un

[facility]

 

                POCT GRP A STREP 2020 23:33:00 [facility]



                (MOLECULAR)                                     Medical [place]

 

                REFERRAL- REQUEST/RESPONSE 2019 05:01:00 Doctor Hallie

, [facility] Name         [facility]







Encounters







        Start   End     Encounter Admission Attending Care    Care    Encounter 

Source



        Date/Time Date/Time Type    Type    Clinicians Facility Department ID   

   

 

        2021-10-30         Emergency                 [facility]    [id number] 

Univers



        19:58:41                                                         [facility]

 

        2021 Outpatient WERNER BECK [facility]    [id number]

-20 Univers



        15:00:00 15:00:00                 FARHAT                    [id number]  [facility]

 

        2021 Outpatient WERNER BECK [facility]    [id number]

311 Univers



        15:00:00 15:00:00                 FARHAT                            [facility]

 

        2021 Telemedici         [facility]    1.2.840.114 [id number]

[id number] Univers



        10:42:03 11:12:03 ne Visit         Farhat    [place] 350.1.13.10        

 ity of



                                                IALTY   4.2.7.2.686         [place]

[place]



                                                CENTER  [phone]         [address]



                                                DIABETES                 



                                                CLINIC                  

 

        2021 Technician         Lydia, Laly [facility]    1.2.8

[id number] 

Univers



        15:05:00 15:20:00 Visit           Farhat Beck 350.1.13.10     

    ity of



                                                [place] 4.2.7.2.686         [place]

[place]



                                                PROFESS[place] [phone]         Me

dical



                                                [address]                 

 

        2021 Outpatient R               [facility]    [id number]

-20 Univers



        15:00:00 15:00:00                                         [id number]  ity [facility]

 

        2021 [facility] R       [facility]    [id number]

110 Univers



        15:00:00 15:00:00                 FARHAT                            ity of



                                                                        [facility]

 

        2021 Orders          Doctor  DERIK    1.2.840.114 [id number]

14 Univers



        00:00:00 00:00:00 Only            Unassigned, JAMEY   350.1.13.10       

  ity of



                                        [facility] 4.2.7.2.686         [place]

as



                                                        [phone]         [address]

 

        2021 Telephone         [facility]    [id number]

3218 Univers



        00:00:00 00:00:00                 Far[place]    MULTISPEC 350.1.13.10         

ity of



                                                IALTY   4.2.7.2.686         [place]

s



                                                CENTER  [phone]         [address]



                                                DIABETES                 



                                                CLINIC                  

 

        2021 Orders          Doctor  DERIK    1.2.840.114 [id number]

73 Univers



        00:00:00 00:00:00 Only            Unassigned, JAMEY   350.1.13.10       

  ity of



                                        [facility] 4.2.7.2.686         [place]fred

as



                                                        [phone]         [address]

 

        2021 Telephone         [facility]    [id number]

5054 Univers



        00:00:00 00:00:00                 Far[place]    MULTISPEC 350.1.13.10         

ity of



                                                IALTY   4.2.7.2.686         [place]

[place]



                                                CENTER  [phone]         [address]



                                                DIABETES                 



                                                CLINIC                  

 

        2021 Technician         [facility]    1.2.840.114 [id number]

[id number] Univers



        16:42:32 16:57:32 Visit           Farhat Beck 350.1.13.10    

     ity of



                                                IALTY   4.2.7.2.686         [place]

[place]



                                                CENTER  [phone]         [address]



                                                DIABETES                 



                                                CLINIC                  

 

        2021 Office          [facility]    1.2.840.114 [id number]

13 Univers



        15:57:47 16:40:04 Visit           Farhat JOPEC 350.1.13.10         

ity of



                                                IALTY   4.2.7.2.686         [facility]

s



                                                CENTER  [phone]         [address]



                                                DIABETES                 



                                                CLINIC                  

 

        2021 Outpatient WERNER BECK [facility]    [id number]

-20 Univers



        16:00:00 16:00:00                 FARHAT                    [id number]  [facility]

 

        2021 Outpatient WERNER BECK [facility]    [id number]

182 Univers



        16:00:00 16:00:00                 FARHAT                            itjose [facility]

 

        2021 Telephone         [facility]    [id number]

4823 Univers



        00:00:00 00:00:00                 Farhat JOPEC 350.1.13.10         

ity of



                                                IALTY   [id number]         [facility]

s



                                                CENTER  [phone]         [address]



                                                DIABETES                 



                                                CLINIC                  

 

        2021 Outpatient WERNER FLORES [facility]    [id number]

-20 Univers



        14:30:00 14:30:00                 JOHN                   [id number]  [facility]

 

        2021 [facility]ORES [facility]    [id number]

364 Univers



        14:30:00 14:30:00                 JOHN                           [facility]

 

        [facility]    [id number]

-20 Univers



        13:30:00 13:30:00                 JOHN                   [id number]  [facility]

 

        2021 [facility] R       [facility]    [id number]

580 Univers



        13:30:00 13:30:00                 JOHN                           [facility]

 

        2021 Orders          Doctor  DERIK    1.2.840.114 [id number]

53 Univers



        00:00:00 00:00:00 Only            Unassigned, JAMEY   350.1.13.10       

  ity of



                                        [facility] 4.2.7.2.686         [place]

as



                                                        [phone]         Medi

maria a



                                                        009             [place]

 

        2021 Office          [facility]    [id number]

0436 Univers



        10:21:11 11:05:39 Visit           Maggie Laura 350.1.13.10         i

ty [facility] 4.2.7.2.686         [place]

s



                                                [facility] [phone]         Me

dical



                                                [facility]     188             [facility]                 

 

        2021 Outpatient R       [facility]    [id number]

4N-20 Univers



        10:30:00 10:30:00                 MAGGIE                  [id number]  [facility]

 

        2021 Outpatient R       [facility]    [id number]

[id number] Univers



        10:30:00 10:30:00                 MAGGIE                          ity [facility]

 

        2021 [facility]    [id number]

[id number] Univers



        10:30:00 23:59:00 Encounter         Be Laura 350.1.13.10        

 ity [facility] 4.2.7.2.686         [place]

[place]



                                                [place]  [phone]         Medi

maria a



                                                        801             Branch

 

        2021 Outpatient R       [facility]    [id number]

N-20 Univers



        00:00:00 00:00:00                 BE                   [id number]  ity of



                                                                        [facility]

 

        2021 Outpatient R       [facility]    [id number]

5194 Univers



        00:00:00 00:00:00                 BE                           ity of



                                                                        [facility]

 

        2021 Telephone         [facility]    [id number]

[id number] Univers



        00:00:00 00:00:00                 Licha Laura 350.1.13.10         

ity of



                                                [place] 4.2.7.2.686         [facility]

[facility] [phone]         [facility]     204             Branch



                                                [facility]                 

 

        2021 Telephone         [facility]    1.2.840.114 [id number]

[id number] Univers



        00:00:00 00:00:00                 Be   Health  350.1.13.10         it

y of



                                                Cancer  4.2.7.2.686         [facility] - [phone]         Med

ical



                                                MDA     204             Branch

 

        2021-01-15 2021-01-15 [facility]    [id number]

7735 Univers



        19:00:00 23:59:00 Encounter         Be Laura 350.1.13.10        

 ity of



                                                [place] 4.2.7.2.686         [facility]  [phone]         [address]

 

        2021-01-15 2021-01-15 Outpatient         [facility]    [id number]

N-20 Univers



        19:00:00 19:00:00                 BE                   [id number]  ity of



                                                                        [facility]

 

        2021-01-15 2021-01-15 [facility]    [id number]

7734 Univers



        18:30:00 18:59:00 Encounter         Be   [place] 350.1.13.10        

 ity of



                                                [place] 4.2.7.2.686         [facility]  [phone]         [address]

 

        2021-01-15 2021-01-15 Outpatient R       [facility]    [id number]

8603 Univers



        00:00:00 00:00:00                 BE                           ity [facility]

 

        2021 Office          [facility]    1.2.840.114 [id number]

184 Univers



        14:18:25 15:36:40 Visit           [facility] 350.1.13.10         i

ty [facility] 4.2.7.2.686         [place]

[place]



                                                [place] [phone]         Me

dical



                                                [facility]     204             [facility]                 

 

        2021 [facility] R       [facility]    [id number]

N-20 Univers



        14:30:00 14:30:00                 [facility]                   [id number]  ity [facility]

 

        2021 Outpatient R       [facility]    [id number]

3020 Univers



        14:30:00 14:30:00                 BE                           [facility]

 

        2021 Orders          Doctor MORE    1.2.840.114 [id number]

67 Univers



        00:00:00 00:00:00 Only            Unassigned, JAMEY   350.1.13.10       

  ity of



                                        [place] [facility] 4.2.7.2.686         [place]

as



                                                        [phone]         [address]

 

        2020 Urgent          Provider, [facility]    

1.2.840.114 

[id number]                                Univers



        17:03:55 17:50:35 Care            Unknown, Attending Health  350.1.13.10

         ity St. [facility] 4.2.7.2.686         Wilfred

as



                                                Professio [phone]         [facility]     [address]             [place]



                                                Office                  



                                                [facility]                 



                                                One                     

 

        2020 Urgent          Provider, [facility]    [id number]

[id number] 



        17:03:55 17:50:35 Care            [facility]  350.1.13.10        

 



                                        Care    [place] 4.2.7.2.686         



                                                Professio [phone]         



                                                [address]                 



                                                One                     

 

        2020 Outpatient R               [facility]    [id number]

-20 Univers



        17:00:00 17:00:00                                         [id number]  ity [facility]

 

        2020 Outpatient R               [facility]    [id number]

[id number] Univers



        17:00:00 17:00:00                                                 ity [facility]

 

        2020 Letter          Doctor  DERIK    1.2.840.114 [id number]

27 Univers



        00:00:00 00:00:00 (Out)           Unassigned, JAMEY   350.1.13.10       

  ity of



                                        [facility] 4.2.7.2.686         Wilfred

as



                                                        [phone]         [address]

 

        2020 Letter          Doctor MORE    1.2.840.114 [id number]

27 



        00:00:00 00:00:00 (Out)           Unassigned, JAMEY   350.1.13.10       

  



                                        [facility] 4.2.7.2.686         



                                                        [phone]         



                                                        [facility]             

 

        2020 Urgent          Provider, [facility]    

1.2.840.114 

[id number]                                Univers



        15:39:22 15:59:22 Care            Francisco, [facility]  350.1.13.10    

     ity of



                                                [place] 4.2.7.2.686         Wilfred

as



                                                Professio [phone]         Me

[address]



                                                Office                  



                                                Building                 



                                                One                     

 

        2020 Urgent          Provider, [facility]    [id number]

[id number] 



        15:39:22 15:59:22 Care            [facility]  350.1.13.10        

 



                                        Care    [place] 4.2.7.2.686         



                                                Professio [phone]         



                                                [address]                     

 

        2020 Outpatient R               [facility]    [id number]

-20 Univers



        15:40:00 15:40:00                                           ity [facility]

 

        2020 Outpatient R               [facility]    [id number]

711 Univers



        15:40:00 15:40:00                                                 ity [facility]

 

        2020 Outpatient R               [facility]    [id number]

-20 Univers



        15:40:00 15:40:00                                           ity [facility]

 

        2020 Outpatient R       FRANCISCO,  [facility]    [id number]

830 Univers



        15:40:00 15:40:00                 ESTER                         ity [facility]

 

        2020 Letter          Doctor MORE    1.2.840.114 [id number]

58 Univers



        00:00:00 00:00:00 (Out)           Unassigned, JAMEY   350.1.13.10       

  ity of



                                        [facility] 4.2.7.2.686         Wilfred

as



                                                        [phone]         [address]

 

        2020 Letter          Doctor  DERIK    1.2.840.114 [id number]

58 



        00:00:00 00:00:00 (Out)           Unassigned, JAMEY   350.1.13.10       

  



                                        [facility] 4.2.7.2.686         



                                                        [phone]         



                                                        [facility]             

 

        2020 Urgent          Mario Motta [facility]    1.2.840.114 

[id number] Univers



        18:52:09 20:25:58 Care            Unknown, [facility]  350.1.13.10

         ity of



                                                Surgical 4.2.7.2.686         Wilfred

as



                                                Specialti [phone]         Me

dical



                                                      [address]             [facility]                 

 

        2020 Urgent          Kalia [facility]    1.2.840.114 [id number]

22 



        18:52:09 20:25:58 Care            [facility]  350.1.13.10         



                                                Surgical 4.2.7.2.686         



                                                Specialti [phone]         



                                                es      [address] [place] [address]                 

 

        2020 Outpatient R               [facility]    [id number]

-20 Univers



        18:45:00 18:45:00                                         [id number]  [facility]

 

        2020 Outpatient R       UNKNOWN, [facility]    [id number]

[id number] Univers



        18:45:00 18:45:00                 ATTENDING                         ity 

[facility]

 

        2019-09-10 2019-09-10 Office          Urology, Camryn Villa [facility]    1.2.840.

114 [id number] 

Univers



        14:09:40 15:58:32 Visit           Arcadio Espinoza SPECIALTY 350.1.13

.10         ity of



                                                [place]     4.2.7.2.686         [place]

[place]



                                                COL[place]  [phone]         Medi

maria a



                                                        [address]             [place]

 

        2019-09-10 2019-09-10 Office          Urology, [facility]    1.2.840.114 [id number]

[id number] 



        14:09:40 15:58:32 Visit           Camryn Villa SPECIALTY 350.1.13.10        

 



                                                BAY     4.2.7.2.686         



                                                COLONY  [phone]         



                                                        298             

 

        2019-09-10 2019-09-10 Letter          Brenda [facility]    1.2.840.114 [id number]

[id number] Univers



        00:00:00 00:00:00 (Out)           an, Arcadio SPECIALTY 350.1.13.10        

 ity of



                                                [place]     4.2.7.2.686         [place]

[place]



                                                COL[place]  [phone]         Medi

maria a



                                                        [address]             [place]

 

        2019 Orders          Doctor  DERIK    1.2.840.114 [id number]

79 Univers



        00:00:00 00:00:00 Only            Unassigned, JAMEY   350.1.13.10       

  ity of



                                        [facility] 4.2.7.2.686         Wilfred

as



                                                        [phone]         Medi

maria a



                                                        [address]             [place]







Results







           Test Description Test Time  Test Comments Results    Result Comments 

Source









                    URINALYSIS          2021 23:30:15 









                      Test Item  Value      Reference Range Interpretation Comme

nts









             APPEARANCE (test code = Clear        Clear                     



             3844224717)                                         

 

             COLOR (test code = 1297081136) Yellow       Yellow                 

   

 

             PH (test code = 6607426302)              4.8-8.0                   

 

             SP GRAVITY (test code =              1.003-1.030               



             1231290968)                                         

 

             GLU U QUAL (test code = Normal       Normal                    



             9630122043)                                         

 

             BLOOD (test code = 4479206059) Negative     Negative               

   

 

             KETONES (test code = 4643104860) Negative     Negative             

     

 

             PROTEIN (test code = 2887-8) Negative     Negative                 

 

 

             UROBILIN (test code = 2.0 mg/dL    Normal       A            



             4049709560)                                         

 

             BILIRUBIN (test code = Negative     Negative                  



             6134232806)                                         

 

             NITRITE (test code = 5826468657) Negative     Negative             

     

 

             LEUK ROBERT (test code = Negative     Negative                  



             4615375814)                                         

 

             RBC/HPF (test code = 0532519892)              See_Comment          

      [Automated message] The



                                                                 system which ge

nerated this



                                                                 result transmit

cristian reference



                                                                 range: 0 - 3 HP

F. The



                                                                 reference range

 was not used



                                                                 to interpret th

is result as



                                                                 normal/abnormal

.

 

             WBC/HPF (test code = 6801473674) <1           See_Comment          

      [Automated message] The



                                                                 system which ge

nerated this



                                                                 result transmit

cristian reference



                                                                 range: 0 - 5 HP

F. The



                                                                 reference range

 was not used



                                                                 to interpret th

is result as



                                                                 normal/abnormal

.

 

             BACTERIA (test code = Negative     Negative                  



             9538036336)                                         

 

             SQ EPITH (test code = <1           See_Comment                [Auto

mated message] The



             5612312568)                                         system which ge

nerated this



                                                                 result transmit

cristian reference



                                                                 range: <=2 HPF.

 The reference



                                                                 range was not u

sed to



                                                                 interpret this 

result as



                                                                 normal/abnormal

.

 

             Lab Interpretation (test code = Abnormal                           

    



             01299-0)                                            



[facility] 23[facility]26[facility]38





             Test Item    Value        Reference Range Interpretation Comments

 

             MAGNESIUM (test code = 9860430704) 1.9 mg/dL    1.7-2.4            

       

 

             Lab Interpretation (test code = Normal                             

    



             33703-8)                                            



[facility]21[facility] 23:26:38





             Test Item    Value        Reference Range Interpretation Comments

 

             PHOSPHORUS (test code = 9683820246) 4.3 mg/dL    2.5-5.0           

        

 

             Lab Interpretation (test code = Normal                             

    



             15963-7)                                            



[facility] [id number] 23:26:38





             Test Item    Value        Reference Range Interpretation Comments

 

             NA (test code = 140 mmol/L   135-145                   



             9608012366)                                         

 

             K (test code = 4.0 mmol/L   3.5-5.0                   



             5646982397)                                         

 

             CL (test code = 101 mmol/L                       



             0090364605)                                         

 

             CO2 TOTAL (test code 29 mmol/L    23-31                     



             = 6778328983)                                        

 

             AGAP (test code =              2-16                      



             5905586970)                                         

 

             BUN (test code = 13 mg/dL     7-23                      



             6655138060)                                         

 

             GLUCOSE (test code = 79 mg/dL                         



             0606049346)                                         

 

             CREATININE (test code 0.89 mg/dL   0.60-1.25                 



             = 4706844902)                                        

 

             CALCIUM (test code = 9.9 mg/dL    8.6-10.6                  



             4475966747)                                         

 

             eGFR (test code =              mL/min/1.73m2              



             6732298738)                                         

 

             EDINSON (test code = EDINSON) Association of                           



                          Glomerular Filtration                           



                          Rate (GFR) and Staging                           



                          of Kidney Disease*                           



                          +-----------------------                           



                          +---------------------+-                           



                          ------------------------                           



                          +| GFR (mL/min/1.73 m2)                           



                          ?| With Kidney Damage ?|                           



                          ?Without Kidney                           



                          Damage+-----------------                           



                          ------+-----------------                           



                          ----+-------------------                           



                          ------+| ?>90 ? ? ? ? ?                           



                          ? ? ? ?| ?Stage one ? ?                           



                          ? ? ?| ? Normal ? ? ? ?                           



                          ? ? ?                                  



                          ?+----------------------                           



                          -+---------------------+                           



                          ------------------------                           



                          -+| ?60-89 ? ? ? ? ? ? ?                           



                          ?| ?Stage two ? ? ? ? ?|                           



                          ? Decreased GFR ? ? ? ?                           



                          +-----------------------                           



                          +---------------------+-                           



                          ------------------------                           



                          +| ?30-59 ? ? ? ? ? ? ?                           



                          ?| ?Stage three ? ? ? ?|                           



                          ? Stage three ? ? ? ? ?                           



                          +-----------------------                           



                          +---------------------+-                           



                          ------------------------                           



                          +| ?15-29 ? ? ? ? ? ? ?                           



                          ?| ?Stage four ? ? ? ? |                           



                          ? Stage four ? ? ? ? ?                           



                          ?+----------------------                           



                          -+---------------------+                           



                          ------------------------                           



                          -+| ?<15 (or dialysis) ?                           



                          ?| ?Stage five ? ? ? ? |                           



                          ? Stage five ? ? ? ? ?                           



                          ?+----------------------                           



                          -+---------------------+                           



                          ------------------------                           



                          -+ *Each stage assumes                           



                          the associated GFR level                           



                          has been in effect for                           



                          at least three months.                           



                          ?Stages 1 to 5, with or                           



                          without kidney disease,                           



                          indicate chronic kidney                           



                          disease. Notes:                           



                          Determination of stages                           



                          one and two (with eGFR                           



                          >59mL/min/1.73 m2)                           



                          requires estimation of                           



                          kidney damage for at                           



                          least three months as                           



                          defined by structural or                           



                          functional abnormalities                           



                          of the kidney,                           



                          manifested by                           



                          either:Pathological                           



                          abnormalities or Markers                           



                          of kidney damage                           



                          (including abnormalities                           



                          in the composition of                           



                          the blood or urine or                           



                          abnormalities in imaging                           



                          tests).                                



[facility] URINE [id number] 
23:25:37





             Test Item    Value        Reference Range Interpretation Comments

 

             T. PROT U (test code = 2888-6) 8 mg/dL                             

   

 

             CREAT U (test code = 8763855287) 284.5 mg/dL                       

     

 

             Protein/Creatinine Ratio Urine              0.0-2.0                

   



             (test code = 5089254082)                                        



[facility] Hospital[facility] WITHOUT [id number] 23:03:11





             Test Item    Value        Reference Range Interpretation Comments

 

             WBC (test code = 6690-2)              See_Comment                [A

utomated message]



                                                                 The system [employer]



                                                                 generated this



                                                                 result transmit

cristian



                                                                 reference range

:



                                                                 4.20 - 10.70



                                                                 10*3/?L. The



                                                                 reference range

 was



                                                                 not used to



                                                                 interpret this



                                                                 result as



                                                                 normal/abnormal

.

 

             RBC (test code = 789-8)              See_Comment                [Au

tomated message]



                                                                 The system [employer]



                                                                 generated this



                                                                 result transmit

cristian



                                                                 reference range

:



                                                                 4.26 - 5.52 10*

6/?L.



                                                                 The reference r

jamie



                                                                 was not used to



                                                                 interpret this



                                                                 result as



                                                                 normal/abnormal

.

 

             HGB (test code = 718-7) 17.1 g/dL    12.2-16.4    H            

 

             HCT (test code = 4544-3) 47.3 %       38.4-49.3                 

 

             MCH (test code = 785-6) 31.2 pg      26.1-32.7                 

 

             MCV (test code = 787-2) 86.3 fL      81.7-95.6                 

 

             MCHC (test code = 786-4) 36.2 g/dL    31.2-35.0    H            

 

             PLT (test code = 777-3)              See_Comment                [Au

tomated message]



                                                                 The system [employer]



                                                                 generated this



                                                                 result transmit

cristian



                                                                 reference range

: 150



                                                                 - 328 10*3/?L. 

The



                                                                 reference range

 was



                                                                 not used to



                                                                 interpret this



                                                                 result as



                                                                 normal/abnormal

.

 

             MPV (test code = 11.2 fL      9.8-13.0                  



             26739-0)                                            

 

             RDW-CV (test code = 12.1 %       12.1-15.4                 



             788-0)                                              

 

             RDW-SD (test code = 37.9 fL      38.5-51.6    L            



             43855-5)                                            

 

             NRBC x10^3 (test code = <0.01        See_Comment                [Au

tomated message]



             [id number])                                         The system [employer]



                                                                 generated this



                                                                 result transmit

cristian



                                                                 reference range

:



                                                                 10*3/?L. The



                                                                 reference range

 was



                                                                 not used to



                                                                 interpret this



                                                                 result as



                                                                 normal/abnormal

.

 

             NRBC/100 WBC (test code              See_Comment                [Au

tomated message]



             = [id number])                                        The system [employer]



                                                                 generated this



                                                                 result transmit

cristian



                                                                 reference range

: 0.0



                                                                 - 10.0 /100 WBC

s.



                                                                 The reference r

jamie



                                                                 was not used to



                                                                 interpret this



                                                                 result as



                                                                 normal/abnormal

.

 

             IPF % (test code =                                        



             [id number])                                         

 

             Lab Interpretation (test Abnormal                               



             code = 58268-2)                                        



[facility] [id number] 
17:15:35CT Abdomen and Pelvis without contrast. CLINICAL HISTORY: Abdominal 
pain. S/P left orchiectomy for possibletorsion. TECHNIQUE: Multidetector helical
CT acquisition was obtained from the lungbases to the greater trochanters 
without oral and IV contrast. ?The imageswere reviewed in lung, bone, and soft t
issue windows. FINDINGS: ?Absence of intravenous contrast limits evaluation of 
the solidorgans. Evaluation of the bowel is also limited by lack of oral 
contrast.Comparison is made with contrast-enhanced CT scans of 2015. Lower
lungs: Clear. No pleural effusion or pericardial effusion. Nodefiniteevidence of
hiatal hernia. Liver, Gallbladder and Spleen: Liver is slightly enlarged, 
measuring 17.2cm in length. Spleen is borderline enlarged, 13 x 6.5 cm. No 
grosspathology detected in the liver or in the spleen. No calcified 
gallstones.Biliary ducts and the pancreatic duct appear of normal size. Pe
ritoneum: ?No free air or free fluid. No lymphadenopathy. Pancreas and Adrenals:
?Unremarkable pancreas and adrenal glands. Kidneys and Ureters: ?No visible 
calculi in the renal collecting systems. No hydroureter or hydronephrosis. ? 
Vessels: Normal. Retroperitoneum: No abnormal fluid or lymphadenopathy. Bowel: 
No acute findings. Normal appendix is visualized. Bladder ?and Reproductive 
Organs: Left orchiectomy noted. Bones: No aggressive bone lesions. Soft tissues:
Unremarkable. CONCLUSION:1. Mild hepatomegaly. Borderline splenomegaly.2. S/P 
left orchiectomy.       [facility], Radiant Results Inft User -2021 11:16 AM 
CSTCT Abdomen and Pelvis without contrast.CLINICAL HISTORY: Abdominal pain. S/P 
left orchiectomy for possibletorsion.TECHNIQUE: Multidetector helical CT 
acquisition was obtained from the lungbases to the greater trochanters without 
oral and IV contrast.  The imageswere reviewed in lung, bone, and soft tissue 
windows.FINDINGS:  Absence of intravenous contrast limits evaluation of the 
solidorgans. Evaluation of the bowel is also limited by lack of oral 
contrast.Comparison is made with contrast-enhanced CT scans of 2015. Lower
lungs: Clear. No pleural effusion or pericardial effusion. Nodefinite evidence 
of hiatal hernia.Liver, Gallbladder and Spleen: Liver is slightly enlarged, 
measuring 17.2cm in length. Spleen is borderline enlarged, 13 x 6.5 cm. No 
grosspathology detected in the liver or in the spleen. No calcified 
gallstones.Biliary ducts and the pancreatic duct appear of normal 
size.Peritoneum:  No free air or free fluid. No lymphadenopathy.Pancreas and 
Adrenals:  Unremarkable pancreas and adrenal glands.Kidneys and Ureters:  No 
visible calculi in the renal collecting systems. No hydroureter or 
hydronephrosis.  Vessels: Normal.Retroperitoneum: No abnormal fluid or
lymphadenopathy.Bowel: No acute findings. Normal appendix is visualized.Bladder 
and Reproductive Organs: Left orchiectomy noted. Bones: No aggressive bone 
lesions.Soft tissues: Unremarkable.CONCLUSION:1. Mild hepatomegaly. Borderline 
splenomegaly.2. S/P left orchiectomy.[facility] 
SCROTUM AND [id number] 04:24:45 Suspect left hernia containing bowel. In
the setting of recent surgery, CTof the pelvis with contrast can be considered 
to evaluate for possiblepostsurgical complications. Preliminary Report Dictated 
by Resident: Samuel O Krider I, Samuel ?Bezold, MD., have reviewed this study 
and agree with the abovereport.Exam: SCROTAL ULTRASOUND HISTORY: Tender left and
swollen left spermatic cord s/p Left orchiectomySept . ?. TECHNIQUE: 
Grayscale and color Doppler images of the scrotum were obtained. FINDINGS:  
RIGHT TESTICLE: Normal size, shape, and echotexture without a focal lesion.The 
right testicle measures 4.6 x 2.3 x 3.4 cm (19 mL).  LEFT TESTICLE: ?Prior 
orchectomy changes are noted. A heterogenousappearing 2.3 x 1.1 x 1.7 cm 
hypoechoic structure is visualized with mildinternal blood flow on color 
Doppler. Loops of bowel are noted in theadjacent abdominal cavity and have a 
somewhat similar appearance. While nogut signature is identified in the inguinal
canal, there is soft tissuemovement along/parallel to the inguinal canal during 
Valsalva maneuvers. EPIDIDYMIDES: The right epididymis appears unremarkable and 
measures up to1.0 cm. No focal masses.. BLOOD FLOW: Normal flow visualized on 
color Doppler in the right testicle. SCROTUM: No hydrocele. VARICOCELE: None. 
[facility], Radiant Results Inft User - 01/15/2021 10:25 PM CSTExam: SCROTAL 
ULTRASOUNDHISTORY: Tender left and swollen left spermatic cord s/p Left 
orchiectomySept .  .TECHNIQUE: Grayscale and color Doppler images of the 
scrotum were obtained.FINDINGS: RIGHT TESTICLE: Normal size, shape, and 
echotexture without a focal lesion.The right testicle measures 4.6 x 2.3 x 3.4 
cm (19 mL). LEFT TESTICLE:  Prior orchectomy changes are noted. A 
heterogenousappearing 2.3 x 1.1 x 1.7 cm hypoechoic structure is visualized with
mildinternal blood flow on color Doppler. Loops of bowel are noted in 
theadjacent abdominal cavity and have a somewhat similar appearance. While nogut
signature is identified in the inguinal canal, there is soft tissuemovement 
along/parallel to the inguinal canal during Valsalva maneuvers.EPIDIDYMIDES: The
right epididymis appears unremarkable and measures up to1.0 cm. No focal 
masses..BLOOD FLOW: Normal flow visualized on color Doppler in the right 
testicle.SCROTUM: No hydrocele.VARICOCELE: None.IMPRESSIONSuspect left hernia 
containing bowel. In the setting of recent surgery, CTof the pelvis with 
contrast can be considered to evaluate for possiblepostsurgical 
complications.Preliminary Report Dictated by Resident:Samuel O KriderI, Samuel Bezold, MD., have reviewed this study and agree with the abovereport.[facility] RETROPERITONEAL [id number] 04:13:53 1. 
?Unremarkable sonographic appearance of kidneys and bladder.   Preliminary 
Report Dictated by Resident: Samuel O Krider I, Samuel ?Bezold, MD., have 
reviewed this study and agree with the abovereport.EXAM: US RETROPERITONEAL 
COMPLETE HISTORY: 20 years-old Male with Tender left and swollen left spermatic 
cords/p Left orchiectomy 2020. Need to assess upper tracts . TECHNIQUE: 
Ultrasound of kidneys and bladder was performed with grayscaleand selected color
Doppler imaging. Representative images were obtained forthe record. COMPARISON: 
CT abdomen and pelvis with contrast, 2015. FINDINGS: 
KIDNEYS:RIGHT:Size:Normal, measuring 9.8 x 3.9 x 4.2 cm (84 mL).Parenchyma: 
Normal renal cortical echogenicity and thickness. No focalsolid or cystic renal 
lesions are detected.Collecting System: No hydronephrosis.Other: None. 
LEFT:Size:Normal, measuring 10.5 x 5.0 x 4.5 cm (126 mL).Parenchyma:Normalrenal 
cortical echogenicity and thickness. No focal solidor cystic renal lesions are 
detected.Collecting System: No hydronephrosis.Other: None. BLADDER: Bladder is 
distended and unremarkable OTHER: None. [facility], Radiant Results Inft User - 
01/15/2021 10:14 PM CSTEXAM: US RETROPERITONEAL COMPLETEHISTORY:20 years-old 
Male with Tender left and swollen left spermatic cords/p Left orchiectomy 2020. Need to assess upper tracts .TECHNIQUE: Ultrasound of kidneys and bladder 
was performed with grayscaleand selected color Doppler imaging. Representative 
images were obtained forthe record.COMPARISON: CT abdomen and pelvis with 
contrast, 2015.FINDINGS: KIDNEYS:RIGHT:Size:Normal, measuring 9.8 x 3.9 x 
4.2 cm (84 mL).Parenchyma: Normal renal cortical echogenicity and thickness. No 
focalsolid or cystic renal lesions are detected.Collecting System: No 
hydronephrosis.Other: None.LEFT:Size:Normal, measuring 10.5 x 5.0 x 4.5 cm (126 
mL).Parenchyma:Normal renal cortical echogenicity and thickness. No focal 
solidor cystic renal lesions are detected.Collecting System: No 
hydronephrosis.Other: None.BLADDER: Bladder is distended and unremarkableOTHER: 
None.IMPRESSION1.  Unremarkable sonographic appearance of kidneys and bladder. 
Preliminary Report Dictated by Resident: Nikita Burgess, Samuel Bezold, MD., 
have reviewed this study and agree with the abovereport.[facility] URINALYSIS, [id number] 20:54:00





             Test Item    Value        Reference Range Interpretation Comments

 

             POCT U SP GRAV (test code = 1.025 mg/dl  1.005-1.025               



             3255)                                               

 

             POCT PH U (test code = 3254) 6.0 mg/dl    5-8                      

 

 

             POCT U LEUK EST (test code = negative     Negative - Negative      

        



             3263)                                               

 

             POCT U NIT (test code = 3262) negative     Negative - Negative     

         

 

             POCT U PROT (test code = negative     Negative - Negative          

    



             3259)                                               

 

             POCT U GLU (test code = 3256) negative     Negative - Negative     

         

 

             POCT U KETONE (test code = trace        Negative - Negative        

      



             3258)                                               

 

             POCT U UROBILI (test code = 0.2 mg/dl    0.2-1                     



             3260)                                               

 

             POCT U BILI (test code = small        Negative - Negative          

    



             3261)                                               

 

             POCT U BLD (test code = 3257) negative     Negative - Negative     

         

 

             POCT U COLOR (test code = dark yellow                            



             3266)                                               

 

             POCT U APPEAR (test code = clear                                  



             3267)                                               



[facility] URINALYSIS, [id number] 20:54:00





             Test Item    Value        Reference Range Interpretation Comments

 

             POCT U SP GRAV (test code = 1.025 mg/dl  1.005-1.025               



             3255)                                               

 

             POCT PH U (test code = 3254) 6.0 mg/dl    5-8                      

 

 

             POCT U LEUK EST (test code = negative     Negative - Negative      

        



             3263)                                               

 

             POCT U NIT (test code = 3262) negative     Negative - Negative     

         

 

             POCT U PROT (test code = negative     Negative - Negative          

    



             3259)                                               

 

             POCT U GLU (test code = 3256) negative     Negative - Negative     

         

 

             POCT U KETONE (test code = trace        Negative - Negative        

      



             3258)                                               

 

             POCT U UROBILI (test code = 0.2 mg/dl    0.2-1                     



             3260)                                               

 

             POCT U BILI (test code = small        Negative - Negative          

    



             3261)                                               

 

             POCT U BLD (test code = 3257) negative     Negative - Negative     

         

 

             POCT U COLOR (test code = dark yellow                            



             3266)                                               

 

             POCT U APPEAR (test code = clear                                  



             3267)                                               



[facility] 
23:43:00





             Test Item    Value        Reference Range Interpretation Comments

 

             POCT GP A STREP (test neg          Negative -                



             code = 64895-1)              Negative                  

 

             EDINSON (test code = EDINSON) accurate development                         

  



                          and interpretation of                           



                          all internal controls                           

 

             Lab Interpretation Normal                                 



             (test code = 10785-8)                                        



[facility]

## 2022-02-21 NOTE — EDPHYS
Physician Documentation                                                                           

 [facility]                                                                 

Name: David Allison                                                                              

Age: 21 yrs                                                                                       

Sex: Male                                                                                         

: 2000                                                                                   

MRN: [id number]                                                                                   

Arrival Date: 2022                                                                          

Time: 15:21                                                                                       

Account#: [id number]                                                                            

Bed 8                                                                                             

Private MD:                                                                                       

ED Physician Joey Galeano                                                                      

HPI:                                                                                              

                                                                                             

17:00 This 21 yrs old  Male presents to ER via Ambulatory with complaints of Heart    lg 

      Issue, Shortness Of Breath.                                                                 

17:00 The patient has shortness of breath with light activity. Onset: The symptoms/episode    lg 

      began/occurred 1 day(s) ago. Duration: The symptoms are intermittent. The patient's         

      shortness of breath has no apparent modifying factors. Associated signs and symptoms:       

      The patient has no apparent associated signs or symptoms. Severity of symptoms: At          

      their worst the symptoms were mild moderate in the emergency department the symptoms        

      are unchanged. The patient has not experienced similar symptoms in the past.                

                                                                                                  

Historical:                                                                                       

- Allergies:                                                                                      

15:47 Abilify;                                                                                ph  

15:47 Advair Diskus;                                                                          ph  

15:47 Geodon;                                                                                 ph  

15:47 Seroquel;                                                                               ph  

15:47 Wellbutrin;                                                                             ph  

- Home Meds:                                                                                      

15:47 propranolol 10 mg Oral tab 1 tab daily [Active]; citalopram 20 mg tab 1.5 tabs once     ph  

      daily [Active]; hydroxyzine HCl 25 mg Oral tab 1 tab twice a day [Active]; nifedipine       

      90 mg oral tr24 1 tab [Active]; Strattera 18 mg Oral cap daily [Active];                    

- PMHx:                                                                                           

15:47 ADD/ADHD; Anxiety; Depression; Hypertension; ocd; PTSD; testicular torsion; Heart       ph  

      murmur;                                                                                     

                                                                                                  

- Immunization history:: Adult Immunizations up to date.                                          

- Social history:: Smoking status: Patient denies any tobacco usage or history of.                

                                                                                                  

                                                                                                  

ROS:                                                                                              

17:02 Constitutional: Negative for fever, chills, and weight loss, Eyes: Negative for injury, lg 

      pain, redness, and discharge, ENT: Negative for injury, pain, and discharge, Neck:          

      Negative for injury, pain, and swelling, Abdomen/GI: Negative for abdominal pain,           

      nausea, vomiting, diarrhea, and constipation, Back: Negative for injury and pain, :       

      Negative for injury, bleeding, discharge, and swelling, MS/Extremity: Negative for          

      injury and deformity, Skin: Negative for injury, rash, and discoloration, Neuro:            

      Negative for headache, weakness, numbness, tingling, and seizure, Psych: Negative for       

      depression, anxiety, suicide ideation, homicidal ideation, and hallucinations,              

      Allergy/Immunology: Negative for hives, rash, and allergies, Endocrine: Negative for        

      neck swelling, polydipsia, polyuria, polyphagia, and marked weight changes,                 

      Hematologic/Lymphatic: Negative for swollen nodes, abnormal bleeding, and unusual           

      bruising.                                                                                   

17:02 Cardiovascular: Positive for chest pain, of the chest.                                      

17:02 Respiratory: Positive for shortness of breath, at rest.                                     

                                                                                                  

Exam:                                                                                             

17:02 Constitutional:  This is a well developed, well nourished patient who is awake, alert,  lg 

      and in no acute distress. Head/Face:  Normocephalic, atraumatic. Eyes:  Pupils equal        

      round and reactive to light, extra-ocular motions intact.  Lids and lashes normal.          

      Conjunctiva and sclera are non-icteric and not injected.  Cornea within normal limits.      

      Periorbital areas with no swelling, redness, or edema. ENT:  Nares patent. No nasal         

      discharge, no septal abnormalities noted.  Tympanic membranes are normal and external       

      auditory canals are clear.  Oropharynx with no redness, swelling, or masses, exudates,      

      or evidence of obstruction, uvula midline.  Mucous membranes moist. Neck:  Trachea          

      midline, no thyromegaly or masses palpated, and no cervical lymphadenopathy.  Supple,       

      full range of motion without nuchal rigidity, or vertebral point tenderness.  No            

      Meningismus. Chest/axilla:  Normal chest wall appearance and motion.  Nontender with no     

      deformity.  No lesions are appreciated. Cardiovascular:  Regular rate and rhythm with a     

      normal S1 and S2.  No gallops, murmurs, or rubs.  Normal PMI, no JVD.  No pulse             

      deficits. Respiratory:  Lungs have equal breath sounds bilaterally, clear to                

      auscultation and percussion.  No rales, rhonchi or wheezes noted.  No increased work of     

      breathing, no retractions or nasal flaring. Abdomen/GI:  Soft, non-tender, with normal      

      bowel sounds.  No distension or tympany.  No guarding or rebound.  No evidence of           

      tenderness throughout. Back:  No spinal tenderness.  No costovertebral tenderness.          

      Full range of motion. Male :  Normal genitalia with no discharge or lesions. Skin:        

      Warm, dry with normal turgor.  Normal color with no rashes, no lesions, and no evidence     

      of cellulitis. MS/ Extremity:  Pulses equal, no cyanosis.  Neurovascular intact.  Full,     

      normal range of motion. Neuro:  Awake and alert, GCS 15, oriented to person, place,         

      time, and situation.  Cranial nerves II-XII grossly intact.  Motor strength 5/5 in all      

      extremities.  Sensory grossly intact.  Cerebellar exam normal.  Normal gait. Psych:         

      Awake, alert, with orientation to person, place and time.  Behavior, mood, and affect       

      are within normal limits.                                                                   

17:02 Musculoskeletal/extremity: DVT Exam: No signs of deep vein thrombosis. no pain, no          

      swelling, no tenderness, negative Homans' sign noted on exam, no appreciated bluish         

      discoloration, no erythema, no increased warmth.                                            

                                                                                                  

Vital Signs:                                                                                      

15:45  / 82; Pulse 73; Resp 18; Temp 98.0; Pulse Ox 100% on R/A; Weight 96.16 kg;       ph  

      Height 5 ft. 9 in. (175.26 cm);                                                             

19:00  / 95; Pulse 87; Resp 16; Pulse Ox 100% on R/A;                                   st1 

20:00  / 88; Pulse 73; Resp 16; Pulse Ox 99% on R/A;                                    st1 

21:00  / 82; Pulse 86; Resp 16; Pulse Ox 97% on R/A;                                    st1 

15:45 Body Mass Index 31.31 (96.16 kg, 175.26 cm)                                             ph  

                                                                                                  

MDM:                                                                                              

16:51 Patient medically screened.                                                             lg 

17:03 Differential diagnosis: CHF exacerbation, abnormal EKG, acute myocardial infarction,    lg 

      acute pericarditis, coronary artery disease cholecystitis, esophagitis, hiatal hernia,      

      pulmonary edema, Pulmonary Embolism Unstable Angina. Antibiotic administration: Not         

      indicated. HEART Score: History: Moderately Suspicious (1), ECG: Normal (0), Age: < or      

      = 45 years (0), Risk Factors: 1 or 2 risk factors (1), [Hypertension] [+ Family HX]         

      Troponin: < or = 1 x Normal Limit (0). The patient's Wells Deep Vein Thrombosis Score       

      was calculated as follows: Total Score: 0. This patient was found to be at low risk for     

      a deep vein thrombosis by using the Well's assessment criteria Total Score: 0-2 Pts-        

      Low Risk. The patient's pulmonary embolism risk score was calculated as follows: Total      

      Score: 0-2 points. This patient was found to be at low risk for a pulmonary embolism by     

      using the Well's assessment criteria Total Score: 0-2 points. This patient was found to     

      be at low risk for a pulmonary embolism by using the Well's assessment criteria. JESÚS       

      Risk Score: TOTAL SCORE = 0 Total Score = 0. Immunization status:. Data reviewed: vital     

      signs, nurses notes, lab test result(s), EKG, radiologic studies, CT scan, plain films.     

      Data interpreted: Cardiac monitor: rate is 73 beats/min, rhythm is regular, Pulse           

      oximetry: on room air is 100 %. Test interpretation: by ED physician or midlevel            

      provider: ECG, plain radiologic studies. Counseling: I had a detailed discussion with       

      the patient and/or guardian regarding: the historical points, exam findings, and any        

      diagnostic results supporting the discharge/admit diagnosis, lab results, radiology         

      results.                                                                                    

                                                                                                  

                                                                                             

17:00 Order name: Basic Metabolic Panel; Complete Time: 17:50                                 [facility] 

                                                                                             

17:00 Order name: CBC with Diff; Complete Time: 17:43                                         [facility] 

                                                                                             

17:00 Order name: LFT's; Complete Time: 17:50                                                 [facility] 

                                                                                             

17:00 Order name: Magnesium; Complete Time: 17:50                                             [facility] 

                                                                                             

17:00 Order name: NT PRO-BNP; Complete Time: 17:50                                            [facility] 

                                                                                             

17:00 Order name: PT-INR; Complete Time: 18:10                                                [facility] 

                                                                                             

17:00 Order name: Troponin HS; Complete Time: 17:50                                           [facility] 

                                                                                             

17:00 Order name: XRAY Chest (1 view); Complete Time: 18:41                                   [facility] 

                                                                                             

17:00 Order name: EKG; Complete Time: 17:01                                                   [facility] 

                                                                                             

17:00 Order name: Cardiac monitoring; Complete Time: 17:24                                    [facility] 

                                                                                             

17:00 Order name: D-Dimer; Complete Time: 18:10                                               [facility] 

                                                                                             

17:00 Order name: SARS-COV-2 RT PCR (Document "Date of Onset" if Symptomatic)                 [facility] 

                                                                                             

17:00 Order name: EKG - Nurse/Tech; Complete Time: 17:11                                      [facility] 

                                                                                             

17:00 Order name: IV Saline Lock; Complete Time: 17:24                                        [facility] 

                                                                                             

17:00 Order name: Labs collected and sent; Complete Time: 17:24                               lg 

                                                                                             

17:00 Order name: O2 Per Protocol; Complete Time: 17:11                                       lg 

                                                                                             

17:00 Order name: O2 Sat Monitoring; Complete Time: 17:11                                     lg 

                                                                                                  

Administered Medications:                                                                         

17:31 Drug: Aspirin Chewable Tablet 162 mg Route: PO;                                         ww  

17:31 Drug: Pepcid (famotidine) 20 mg Route: IVP; Site: left antecubital;                     ww  

                                                                                                  

                                                                                                  

Disposition Summary:                                                                              

22 23:23                                                                                    

Left Against Medical Advice                                                                       

      Location: Home                                                                          [email or web address] 

      Problem: new(22 23:23)                                                            jr8 

      Symptoms: have improved(22 23:23)                                                 jr8 

      Condition: Stable(22 23:23)                                                       jr8 

      Diagnosis                                                                                   

        - Chest pain, unspecified(22 23:23)                                             jr8 

Signatures:                                                                                       

Dispatcher [employer]                           EDJoey Junior MD MD cha Roszak, Josh, PA PA   jr8                                                  

Emma Denis RN                      RN   [facility]                       RN   [facility]                                                   

                                                                                                  

Corrections: (The following items were deleted from the chart)                                    

23:22 17:43 to [facility], heart care team lg                                                       [email or web address] 

23:22 17:43 [facility] lg                                                              jr8 

23:22 17:43 Higher level of care lg                                                          jr8 

23:22 17:43 Stable lg                                                                        jr8 

23:22 17:43 new lg                                                                           jr8 

23:22 17:43 have improved lg                                                                 jr8 

23:22 17:43 Chest pain, unspecified lg                                                       jr8 

23:22 17:43 Dyspnea lg                                                                       jr8 

23:22 17:43 Essential (primary) hypertension lg                                              jr8 

                                                                                                  

**************************************************************************************************

## 2022-02-22 VITALS — DIASTOLIC BLOOD PRESSURE: 82 MMHG | OXYGEN SATURATION: 97 % | SYSTOLIC BLOOD PRESSURE: 127 MMHG

## 2022-02-22 VITALS — TEMPERATURE: 98 F

## 2022-02-22 NOTE — EKG
Test Date:    2022-02-21               Test Time:    15:53:05

Technician:   PH                                     

                                                     

MEASUREMENT RESULTS:                                       

Intervals:                                           

Rate:         70                                     

NM:           140                                    

QRSD:         76                                     

QT:           376                                    

QTc:          406                                    

Axis:                                                

P:            47                                     

NM:           140                                    

QRS:          16                                     

T:            48                                     

                                                     

INTERPRETIVE STATEMENTS:                                       

                                                     

Normal sinus rhythm

Normal ECG

Compared to ECG 09/12/2019 01:07:19

No significant changes



Electronically Signed On 02-22-22 07:22:36 CST by Casey Liu